# Patient Record
Sex: FEMALE | Race: WHITE | NOT HISPANIC OR LATINO | Employment: FULL TIME | URBAN - METROPOLITAN AREA
[De-identification: names, ages, dates, MRNs, and addresses within clinical notes are randomized per-mention and may not be internally consistent; named-entity substitution may affect disease eponyms.]

---

## 2018-02-20 DIAGNOSIS — L30.9 DERMATITIS: ICD-10-CM

## 2018-02-20 NOTE — TELEPHONE ENCOUNTER
Patient's last office visit was 12/14/2016.  Saw Dr. Mas who is no longer with the clinic.    Saw Dr. Muñoz for the dermatitis in 9/21/2016.    Called patient's home and asked for a call back regarding a refill request.    Nia Cisneros RN, [facility]

## 2018-02-26 NOTE — TELEPHONE ENCOUNTER
Attempt 2    LM for patient to schedule annual physical and call regarding refill request. Nikole Gonzáles RN

## 2018-03-05 RX ORDER — TRIAMCINOLONE ACETONIDE 1 MG/G
CREAM TOPICAL
Qty: 30 G | Refills: 0 | OUTPATIENT
Start: 2018-03-05

## 2018-03-05 NOTE — TELEPHONE ENCOUNTER
Routing refill request to provider for review/approval because:  Kimberly given x1 and patient did not follow up, please advise  Patient needs to be seen because it has been more than 1 year since last office visit.    Last OV with Dr. Muñoz: 9/21/2016    Attempt 3    LM for patient to return clinics call regarding a refill for medication. Asked patient to call and schedule physical if continuing care with Dr. Muñoz.     Nikole Gonzáles RN

## 2018-03-05 NOTE — TELEPHONE ENCOUNTER
Letter sent to patient letting her know that we are unable to authorize another refill until she is seen per Dr. Muñoz. Nikole Gonzáles RN

## 2019-04-01 ENCOUNTER — OFFICE VISIT (OUTPATIENT)
Dept: PEDIATRICS | Facility: CLINIC | Age: 24
End: 2019-04-01
Payer: COMMERCIAL

## 2019-04-01 VITALS
WEIGHT: 139.7 LBS | OXYGEN SATURATION: 98 % | TEMPERATURE: 98.5 F | HEART RATE: 74 BPM | SYSTOLIC BLOOD PRESSURE: 105 MMHG | BODY MASS INDEX: 23.28 KG/M2 | HEIGHT: 65 IN | DIASTOLIC BLOOD PRESSURE: 70 MMHG

## 2019-04-01 DIAGNOSIS — R11.2 NON-INTRACTABLE VOMITING WITH NAUSEA, UNSPECIFIED VOMITING TYPE: ICD-10-CM

## 2019-04-01 DIAGNOSIS — J02.9 SORE THROAT: Primary | ICD-10-CM

## 2019-04-01 LAB
DEPRECATED S PYO AG THROAT QL EIA: NORMAL
SPECIMEN SOURCE: NORMAL

## 2019-04-01 PROCEDURE — 87880 STREP A ASSAY W/OPTIC: CPT | Performed by: FAMILY MEDICINE

## 2019-04-01 PROCEDURE — 99214 OFFICE O/P EST MOD 30 MIN: CPT | Performed by: FAMILY MEDICINE

## 2019-04-01 PROCEDURE — 87081 CULTURE SCREEN ONLY: CPT | Performed by: FAMILY MEDICINE

## 2019-04-01 RX ORDER — ONDANSETRON 4 MG/1
4 TABLET, FILM COATED ORAL EVERY 8 HOURS PRN
Qty: 20 TABLET | Refills: 0 | Status: SHIPPED | OUTPATIENT
Start: 2019-04-01 | End: 2019-12-09

## 2019-04-01 ASSESSMENT — PAIN SCALES - GENERAL: PAINLEVEL: NO PAIN (0)

## 2019-04-01 ASSESSMENT — MIFFLIN-ST. JEOR: SCORE: 1403.52

## 2019-04-01 NOTE — PROGRESS NOTES
SUBJECTIVE:   Mike Lawton is a 23 year old female who presents to clinic today for the following health issues:    Acute Illness    Patient is here with concerns of having nausea and vomiting, intermittently for the past 1 week that started with URI symptoms including low-grade fever, chills and headache a week ago  Patient noticed having sore throat and nasal congestion since yesterday  Has a mild cough that is going on for the past week with no associated concerns for wheezing, shortness of breath.  Patient denies abdominal pain, vomiting, UTI symptoms, low back or flank pain.  Patient is sexually active, has Mirena IUD for contraception.  LMP-1 month ago  Does not smoke. Has no h/o asthnma or allergies.  Patient denies having diarrhea, constipation, abnormal skin rashes.         Acute illness concerns: Vomiting last Sunday through last Tuesday, URI symptoms since yesterday.  Onset: 1 week    Fever: YES last night    Chills/Sweats: YES- yesterday    Headache (location?): YES- Saturday night    Sinus Pressure:no    Conjunctivitis:  no    Ear Pain: no    Rhinorrhea: no    Congestion: YES- yesterday    Sore Throat: YES- yesteday     Cough: YES - yesterday    Wheeze: no    Decreased Appetite: no    Nausea: no    Vomiting: YES- last Sunday through last Tuesday    Diarrhea:  no    Dysuria/Freq.: no     Fatigue/Achiness: no    Sick/Strep Exposure: no but patient works in a restaurant     Therapies Tried and outcome: Tylenol - relieved fever          Problem list and histories reviewed & adjusted, as indicated.  Additional history: as documented    Patient Active Problem List   Diagnosis     ADHD (attention deficit hyperactivity disorder), combined type     Anxiety     Warts     Dermatitis     History reviewed. No pertinent surgical history.    Social History     Tobacco Use     Smoking status: Never Smoker     Smokeless tobacco: Never Used   Substance Use Topics     Alcohol use: No     Family History   Problem  Relation Age of Onset     Hypertension Maternal Grandmother          Current Outpatient Medications   Medication Sig Dispense Refill     ondansetron (ZOFRAN) 4 MG tablet Take 1 tablet (4 mg) by mouth every 8 hours as needed for nausea 20 tablet 0     azithromycin (ZITHROMAX) 250 MG tablet Two tablets first day, then one tablet daily for four days. (Patient not taking: Reported on 4/1/2019) 6 tablet 0     methylphenidate ER (BRAND OR BX/ZC GENERIC) 54 MG CR tablet any  Take 1 tablet (54 mg) by mouth every morning (Patient not taking: Reported on 4/1/2019) 30 tablet 0     triamcinolone (KENALOG) 0.1 % cream Apply sparingly to affected [id number] daily as needed for 1- 2 weeks (Patient not taking: Reported on 4/1/2019) 30 g 0     Allergies   Allergen Reactions     Penicillin G      Mother has a hx of allergie     Recent Labs   Lab Test 01/06/14  1916 01/23/13  1647   CR 0.76  --    GFRESTIMATED >90  --    GFRESTBLACK >90  --    POTASSIUM 4.1  --    TSH  --  1.07      BP Readings from Last 3 Encounters:   04/01/19 105/70   12/14/16 103/80   09/21/16 106/70    Wt Readings from Last 3 Encounters:   04/01/19 63.4 kg (139 lb 11.2 oz)   12/14/16 49.2 kg (108 lb 8 oz) (14 %)*   09/21/16 52.1 kg (114 lb 12.8 oz) (27 %)*     * Growth percentiles are based on CDC (Girls, 2-20 Years) data.                  Labs reviewed in EPIC    Reviewed and updated as needed this visit by clinical staff       Reviewed and updated as needed this visit by Provider         ROS:  CONSTITUTIONAL: NEGATIVE for fever, chills, change in weight  INTEGUMENTARY/SKIN: NEGATIVE for worrisome rashes, moles or lesions  EYES: NEGATIVE for vision changes or irritation  ENT/MOUTH: as above  RESP:as above  CV: NEGATIVE for chest pain, palpitations or peripheral edema  GI: as above  : mirena IUD  MUSCULOSKELETAL: NEGATIVE for significant arthralgias or myalgia  PSYCHIATRIC: NEGATIVE for changes in mood or affect    OBJECTIVE:     /70 (BP  "Location: Right arm, Patient Position: Sitting, Cuff Size: Adult Regular)   Pulse 74   Temp 98.5  F (36.9  C) (Oral)   Ht 1.657 m (5' 5.25\")   Wt 63.4 kg (139 lb 11.2 oz)   SpO2 98%   BMI 23.07 kg/m    Body mass index is 23.07 kg/m .  GENERAL: healthy, alert and no distress  EYES: Eyes grossly normal to inspection  HENT: normal cephalic/atraumatic, ear canals and TM's normal, nose and mouth without ulcers or lesions, oropharynx clear, oral mucous membranes moist, sinuses: not tender and minimal posterior pharyngeal wall erythema  NECK: no adenopathy, no asymmetry, masses, or scars and thyroid normal to palpation  RESP: lungs clear to auscultation - no rales, rhonchi or wheezes  CV: regular rate and rhythm, normal S1 S2, no S3 or S4, no murmur, click or rub, no peripheral edema and peripheral pulses strong  ABDOMEN: soft, non tender, no guarding or rigidity, no organomegaly, normal BS, no costovertebral angle tenderness  MS: no gross musculoskeletal defects noted, no edema  SKIN: no suspicious lesions or rashes  BACK: no CVA tenderness, no paralumbar tenderness  PSYCH: mentation appears normal, affect normal/bright    Diagnostic Test Results:  Results for orders placed or performed in visit on 04/01/19 (from the past 24 hour(s))   Strep, Rapid Screen   Result Value Ref Range    Specimen Description Throat     Rapid Strep A Screen       NEGATIVE: No Group A streptococcal antigen detected by immunoassay, await culture report.       ASSESSMENT/PLAN:             1. Sore throat  Results for orders placed or performed in visit on 04/01/19   Strep, Rapid Screen   Result Value Ref Range    Specimen Description Throat     Rapid Strep A Screen       NEGATIVE: No Group A streptococcal antigen detected by immunoassay, await culture report.         Negative test results reviewed with the patient and reassured.  Recommended warm saline gargles, tylenol or motrin prn for pain, throat lozenges, fluids and rtc if no better by " 1week or sooner prn.      - Strep, Rapid Screen  - Beta strep group A culture    2. Non-intractable vomiting with nausea, unspecified vomiting type  DDX-viral? ? Uti/streptococcal infection/? Appendicitis?? pregnancy  Patient does not have any concerns for UTI symptoms  Reviewed negative strep test as mentioned above  Given the absence of fever abdominal pain, appendicitis is less likely  Patient is using Mirena for contraception  Considered UPT, patient declined  We will treat with Zofran as needed for nausea and vomiting, push fluids including Gatorade and Pedialyte  If symptoms are not any better in 3-4 days, patient understands to follow-up for further evaluation  Dosing and potential medication side effects discussed.  Patient verbalised understanding and is agreeable to the plan.      - ondansetron (ZOFRAN) 4 MG tablet; Take 1 tablet (4 mg) by mouth every 8 hours as needed for nausea  Dispense: 20 tablet; Refill: 0    Chart documentation done in part with Dragon Voice recognition Software. Although reviewed after completion, some word and grammatical error may remain.    See Patient Instructions    Ld Muñoz MD  [facility]

## 2019-04-02 LAB
BACTERIA SPEC CULT: NORMAL
SPECIMEN SOURCE: NORMAL

## 2019-11-26 ENCOUNTER — OFFICE VISIT (OUTPATIENT)
Dept: FAMILY MEDICINE | Facility: CLINIC | Age: 24
End: 2019-11-26
Payer: COMMERCIAL

## 2019-11-26 VITALS
HEART RATE: 81 BPM | TEMPERATURE: 98.6 F | BODY MASS INDEX: 24.32 KG/M2 | WEIGHT: 146 LBS | HEIGHT: 65 IN | DIASTOLIC BLOOD PRESSURE: 74 MMHG | SYSTOLIC BLOOD PRESSURE: 114 MMHG | OXYGEN SATURATION: 97 %

## 2019-11-26 DIAGNOSIS — J02.0 STREP THROAT: Primary | ICD-10-CM

## 2019-11-26 LAB
DEPRECATED S PYO AG THROAT QL EIA: ABNORMAL
SPECIMEN SOURCE: ABNORMAL

## 2019-11-26 PROCEDURE — 87880 STREP A ASSAY W/OPTIC: CPT | Performed by: NURSE PRACTITIONER

## 2019-11-26 PROCEDURE — 99213 OFFICE O/P EST LOW 20 MIN: CPT | Performed by: NURSE PRACTITIONER

## 2019-11-26 RX ORDER — AZITHROMYCIN 250 MG/1
TABLET, FILM COATED ORAL
Qty: 6 TABLET | Refills: 0 | Status: SHIPPED | OUTPATIENT
Start: 2019-11-26 | End: 2019-12-09

## 2019-11-26 ASSESSMENT — PAIN SCALES - GENERAL: PAINLEVEL: EXTREME PAIN (8)

## 2019-11-26 ASSESSMENT — MIFFLIN-ST. JEOR: SCORE: 1427.09

## 2019-11-26 NOTE — PROGRESS NOTES
Subjective     Mike Lawton is a 24 year old female who presents to clinic today for the following health issues:    HPI   ENT Symptoms             Symptoms: cc Present Absent Comment   Fever/Chills   x    Fatigue  x     Muscle Aches   x    Eye Irritation   x    Sneezing  x     Nasal Thaddeus/Drg  x     Sinus Pressure/Pain  x     Loss of smell   x    Dental pain   x    Sore Throat  x     Swollen Glands  x     Ear Pain/Fullness   x    Cough  x     Wheeze   x    Chest Pain   x    Shortness of breath   x    Rash   x    Other         Symptom duration:  about 1 week   Symptom severity:  moderate   Treatments tried:  Nyquil   Contacts:  work       No drooling or trismus  No chest pain or shortness of breath    Patient Active Problem List   Diagnosis     ADHD (attention deficit hyperactivity disorder), combined type     Anxiety     Warts     Dermatitis     History reviewed. No pertinent surgical history.    Social History     Tobacco Use     Smoking status: Never Smoker     Smokeless tobacco: Never Used   Substance Use Topics     Alcohol use: No     Family History   Problem Relation Age of Onset     Hypertension Maternal Grandmother          Current Outpatient Medications   Medication Sig Dispense Refill     azithromycin (ZITHROMAX) 250 MG tablet Take 2 tablets (500 mg) by mouth daily for 1 day, THEN 1 tablet (250 mg) daily for 4 days. 6 tablet 0     ondansetron (ZOFRAN) 4 MG tablet Take 1 tablet (4 mg) by mouth every 8 hours as needed for nausea 20 tablet 0     methylphenidate ER (BRAND OR BX/ZC GENERIC) 54 MG CR tablet any  Take 1 tablet (54 mg) by mouth every morning (Patient not taking: Reported on 11/26/2019) 30 tablet 0     Allergies   Allergen Reactions     Penicillin G      Mother has a hx of allergie         Reviewed and updated as needed this visit by Provider  Tobacco  Allergies  Meds  Problems  Med Hx  Surg Hx  Fam Hx         Review of Systems   ROS COMP: Constitutional, HEENT,  "cardiovascular, pulmonary, gi and gu systems are negative, except as otherwise noted.      Objective    /74 (BP Location: Right arm, Patient Position: Chair, Cuff Size: Adult Regular)   Pulse 81   Temp 98.6  F (37  C) (Oral)   Ht 1.657 m (5' 5.25\")   Wt 66.2 kg (146 lb)   SpO2 97%   BMI 24.11 kg/m    Body mass index is 24.11 kg/m .  Physical Exam   GENERAL: healthy, alert and no distress  EYES: Eyes grossly normal to inspection, PERRL and conjunctivae and sclerae normal  HENT: ear canals and TM's normal, nose and mouth without ulcers or lesions. Posterior oropharynx erythematous. No evidence of peritonsillar abscess   NECK: anterior cervical lymphadenopathy  RESP: lungs clear to auscultation - no rales, rhonchi or wheezes  CV: regular rate and rhythm, normal S1 S2, no S3 or S4, no murmur, click or rub, no peripheral edema and peripheral pulses strong  MS: no gross musculoskeletal defects noted, no edema  PSYCH: mentation appears normal, affect normal/bright    Diagnostic Test Results:  Labs reviewed in Epic  Results for orders placed or performed in visit on 11/26/19 (from the past 24 hour(s))   Strep, Rapid Screen   Result Value Ref Range    Specimen Description Throat     Rapid Strep A Screen (A)      POSITIVE: Group A Streptococcal antigen detected by immunoassay.           Assessment & Plan     1. Strep throat  Positive rapid strep  Start antibiotics today. Will treat with azithromycin due to PENICILLIN allergy  Tylenol or ibuprofen as needed for pain or fever  Contagious x12 hours  Change toothbrush when no longer contagious  If worsening or not improving in 3 days, follow up with primary care provider, sooner if needed  - Strep, Rapid Screen  - azithromycin (ZITHROMAX) 250 MG tablet; Take 2 tablets (500 mg) by mouth daily for 1 day, THEN 1 tablet (250 mg) daily for 4 days.  Dispense: 6 tablet; Refill: 0       See Patient Instructions    Return in about 3 days (around 11/29/2019), or if symptoms " worsen or fail to improve.     The benefits, risks and potential side effects were discussed in detail. Black box warnings discussed as relevant. All patient questions were answered. The patient was instructed to follow up immediately if any adverse reactions develop.    Return precautions discussed, including when to seek urgent/emergent care.    Patient verbalizes understanding and agrees with plan of care. Patient stable for discharge.      JUVE Moreau [facility]

## 2019-11-26 NOTE — PATIENT INSTRUCTIONS
Positive rapid strep  Start antibiotics today  Tylenol or ibuprofen as needed for pain or fever  Contagious x12 hours  Change toothbrush when no longer contagious  If worsening or not improving in 3 days, follow up with primary care provider, sooner if needed        Patient Education     Pharyngitis: Strep (Confirmed)    You have had a positive test for strep throat. Strep throat is a contagious illness. It is spread by coughing, kissing or by touching others after touching your mouth or nose. Symptoms include throat pain that is worse with swallowing, aching all over, headache, and fever. It is treated with antibiotic medicine. This should help you start to feel better in 1 to 2 days.  Home care    Rest at home. Drink plenty of fluids to you won't get dehydrated.    No work or school for the first 2 days of taking the antibiotics. After this time, you will not be contagious. You can then return to school or work if you are feeling better.     Take antibiotic medicine for the full 10 days, even if you feel better. This is very important to ensure the infection is treated. It is also important to prevent medicine-resistant germs from developing. If you were given an antibiotic shot, you don't need any more antibiotics.    You may use acetaminophen or ibuprofen to control pain or fever, unless another medicine was prescribed for this. Talk with your healthcare provider before taking these medicines if you have chronic liver or kidney disease. Also talk with your healthcare provider if you have had a stomach ulcer or GI bleeding.    Throat lozenges or sprays help reduce pain. Gargling with warm saltwater will also reduce throat pain. Dissolve 1/2 teaspoon of salt in 1 glass of warm water. This may be useful just before meals.     Soft foods are OK. Don't eat salty or spicy foods.  Follow-up care  Follow up with your healthcare provider or our staff if you don't get better over the next week.  When to seek medical  advice  Call your healthcare provider right away if any of these occur:    Fever of 100.4 F (38 C) or higher, or as directed by your healthcare provider    New or worsening ear pain, sinus pain, or headache    Painful lumps in the back of neck    Stiff neck    Lymph nodes getting larger or becoming soft in the middle    You can't swallow liquids or you can't open your mouth wide because of throat pain    Signs of dehydration. These include very dark urine or no urine, sunken eyes, and dizziness.    Trouble breathing or noisy breathing    Muffled voice    Rash  Prevention  Here are steps you can take to help prevent an infection:    Keep good hand washing habits.    Don t have close contact with people who have sore throats, colds, or other upper respiratory infections.    Don t smoke, and stay away from secondhand smoke.  Date Last Reviewed: 11/1/2017 2000-2018 [employer]. [address] [place] [address]. All rights reserved. This information is not intended as a substitute for professional medical care. Always follow your healthcare professional's instructions.

## 2019-12-05 ENCOUNTER — TELEPHONE (OUTPATIENT)
Dept: FAMILY MEDICINE | Facility: CLINIC | Age: 24
End: 2019-12-05

## 2019-12-05 NOTE — LETTER
December 5, 2019    Mike Lawton  [address] [place] [id number]    Dear Mike Lawton,     At [facility] we care about your health and are committed to providing quality patient care.    Which includes staying current on preventive cancer screenings.  You can increase your chances of finding and treating cancers through regular screenings.      Our records indicate you may be due for the following preventive screening(s):    Cervical Cancer Screening    Pap smear is a screening test used to detect cervical cancer. It is recommended every three years for women 21 and older. The test should be done at least once every three years but women who are at greater risk for cervical cancer may need to have the test more often.    To schedule an appointment or discuss this screening further, you may contact us by phone at the [facility] at [phone] or online through the patient [email or web address]    If you have had any of the screenings listed above at another facility, please call us so that we may update your chart.      Your partners in health,      Quality Committee at [facility]

## 2019-12-05 NOTE — TELEPHONE ENCOUNTER
Panel Management Review   One phone call and send letter if unable to reach them or Zamzeehart message and send letter if not read after 2 weeks (You will get a message to your inbasket)          Health Maintenance Due   Topic Date Due     HPV IMMUNIZATION (1 - Female 2-dose series) 11/22/2008     HIV SCREENING  11/22/2012     PREVENTIVE CARE VISIT  08/06/2015     PAP  11/22/2018     PHQ-2  01/01/2019     INFLUENZA VACCINE (1) 09/01/2019        Fail List measure:         Patient is due/failing the following:   PAP    Action needed:   Patient needs office visit for annual exam.    Type of outreach:    Sent letter.    Questions for provider review:    None                                                                                                                               Delfina Johnson MA

## 2019-12-09 ENCOUNTER — OFFICE VISIT (OUTPATIENT)
Dept: URGENT CARE | Facility: URGENT CARE | Age: 24
End: 2019-12-09
Payer: OTHER MISCELLANEOUS

## 2019-12-09 ENCOUNTER — ANCILLARY PROCEDURE (OUTPATIENT)
Dept: GENERAL RADIOLOGY | Facility: CLINIC | Age: 24
End: 2019-12-09
Attending: PHYSICIAN ASSISTANT
Payer: OTHER MISCELLANEOUS

## 2019-12-09 VITALS
TEMPERATURE: 98.6 F | HEART RATE: 77 BPM | HEIGHT: 64 IN | RESPIRATION RATE: 14 BRPM | BODY MASS INDEX: 24.75 KG/M2 | DIASTOLIC BLOOD PRESSURE: 79 MMHG | WEIGHT: 145 LBS | OXYGEN SATURATION: 96 % | SYSTOLIC BLOOD PRESSURE: 116 MMHG

## 2019-12-09 DIAGNOSIS — S49.92XA SHOULDER INJURY, LEFT, INITIAL ENCOUNTER: Primary | ICD-10-CM

## 2019-12-09 PROCEDURE — 99214 OFFICE O/P EST MOD 30 MIN: CPT | Performed by: PHYSICIAN ASSISTANT

## 2019-12-09 PROCEDURE — 73030 X-RAY EXAM OF SHOULDER: CPT | Mod: LT

## 2019-12-09 ASSESSMENT — ENCOUNTER SYMPTOMS
PALPITATIONS: 0
DIZZINESS: 0
COUGH: 0
NAUSEA: 0
FEVER: 0
DIARRHEA: 0
HEMATOLOGIC/LYMPHATIC NEGATIVE: 1
BRUISES/BLEEDS EASILY: 0
SHORTNESS OF BREATH: 0
WEAKNESS: 0
NECK PAIN: 0
EYES NEGATIVE: 1
SORE THROAT: 0
RHINORRHEA: 0
JOINT SWELLING: 0
WOUND: 0
BACK PAIN: 0
RESPIRATORY NEGATIVE: 1
HEADACHES: 0
NECK STIFFNESS: 0
ARTHRALGIAS: 1
LIGHT-HEADEDNESS: 0
MYALGIAS: 0
VOMITING: 0
ALLERGIC/IMMUNOLOGIC NEGATIVE: 1
CHILLS: 0
ENDOCRINE NEGATIVE: 1
CARDIOVASCULAR NEGATIVE: 1

## 2019-12-09 ASSESSMENT — MIFFLIN-ST. JEOR: SCORE: 1402.72

## 2019-12-09 NOTE — PROGRESS NOTES
Chief Complaint:    Chief Complaint   Patient presents with     Work Comp     heavy lifting ( left shoulder and hand, numbness and tingle in hand ) hurt the worst in left bicep       HPI: Mike Lawton is an 24 year old female who presents for evaluation and treatment of L shoulder pain with numbness and tingling in the L hand.  Symptoms started this morning when she was lifting some coils at work this morning.  She is having L shoulder pain, and some tingling in the L hand.  Her symptoms have improved a little.  She has not tried anything for the pain.  She has not injured the L shoulder in the past.          ROS:      Review of Systems   Constitutional: Negative for chills and fever.   HENT: Negative for congestion, ear pain, rhinorrhea and sore throat.    Eyes: Negative.    Respiratory: Negative.  Negative for cough and shortness of breath.    Cardiovascular: Negative.  Negative for chest pain and palpitations.   Gastrointestinal: Negative for diarrhea, nausea and vomiting.   Endocrine: Negative.    Genitourinary: Negative.    Musculoskeletal: Positive for arthralgias. Negative for back pain, joint swelling, myalgias, neck pain and neck stiffness.   Skin: Negative.  Negative for rash and wound.   Allergic/Immunologic: Negative.  Negative for immunocompromised state.   Neurological: Negative for dizziness, weakness, light-headedness and headaches.   Hematological: Negative.  Does not bruise/bleed easily.        Family History   Family History   Problem Relation Age of Onset     Hypertension Maternal Grandmother        Social History  Social History     Socioeconomic History     Marital status: Single     Spouse name: Not on file     Number of children: Not on file     Years of education: Not on file     Highest education level: Not on file   Occupational History     Not on file   Social Needs     Financial resource strain: Not on file     Food insecurity:     Worry: Not on file     Inability: Not on file      "Transportation needs:     Medical: Not on file     Non-medical: Not on file   Tobacco Use     Smoking status: Never Smoker     Smokeless tobacco: Never Used   Substance and Sexual Activity     Alcohol use: No     Drug use: No     Sexual activity: Never   Lifestyle     Physical activity:     Days per week: Not on file     Minutes per session: Not on file     Stress: Not on file   Relationships     Social connections:     Talks on phone: Not on file     Gets together: Not on file     Attends [employer] service: Not on file     Active member of club or organization: Not on file     Attends meetings of clubs or organizations: Not on file     Relationship status: Not on file     Intimate partner violence:     Fear of current or ex partner: Not on file     Emotionally abused: Not on file     Physically abused: Not on file     Forced sexual activity: Not on file   Other Topics Concern     Not on file   Social History Narrative     Not on file        Surgical History:  History reviewed. No pertinent surgical history.     Problem List:  Patient Active Problem List   Diagnosis     ADHD (attention deficit hyperactivity disorder), combined type     Anxiety     Warts     Dermatitis        Allergies:  Allergies   Allergen Reactions     Penicillin G      Mother has a hx of allergie        Current Meds:    Current Outpatient Medications:      methylphenidate ER (BRAND OR BX/ZC GENERIC) 54 MG CR tablet any , Take 1 tablet (54 mg) by mouth every morning, Disp: 30 tablet, Rfl: 0     PHYSICAL EXAM:     Vital signs noted and reviewed by Kel Vega PA-C  /79   Pulse 77   Temp 98.6  F (37  C)   Resp 14   Ht 1.626 m (5' 4\")   Wt 65.8 kg (145 lb)   SpO2 96%   BMI 24.89 kg/m       PEFR:    Physical Exam  Vitals signs and nursing note reviewed.   Constitutional:       General: She is not in acute distress.     Appearance: She is well-developed. She is not ill-appearing, toxic-appearing or diaphoretic.   HENT:     "  Head: Normocephalic and atraumatic.      Right Ear: Tympanic membrane and external ear normal. No drainage, swelling or tenderness. Tympanic membrane is not perforated, erythematous, retracted or bulging.      Left Ear: Tympanic membrane and external ear normal. No drainage, swelling or tenderness. Tympanic membrane is not perforated, erythematous, retracted or bulging.      Nose: No mucosal edema, congestion or rhinorrhea.      Right Sinus: No maxillary sinus tenderness or frontal sinus tenderness.      Left Sinus: No maxillary sinus tenderness or frontal sinus tenderness.      Mouth/Throat:      Pharynx: No pharyngeal swelling, oropharyngeal exudate, posterior oropharyngeal erythema or uvula swelling.      Tonsils: No tonsillar abscesses.   Eyes:      Pupils: Pupils are equal, round, and reactive to light.   Neck:      Musculoskeletal: Full passive range of motion without pain, normal range of motion and neck supple.      Trachea: Trachea normal.   Cardiovascular:      Rate and Rhythm: Normal rate and regular rhythm.      Heart sounds: Normal heart sounds, S1 normal and S2 normal. No murmur. No friction rub. No gallop.    Pulmonary:      Effort: Pulmonary effort is normal. No respiratory distress.      Breath sounds: Normal breath sounds. No decreased breath sounds, wheezing, rhonchi or rales.   Abdominal:      General: Bowel sounds are normal. There is no distension.      Palpations: Abdomen is soft. Abdomen is not rigid. There is no mass.      Tenderness: There is no abdominal tenderness. There is no guarding or rebound.   Musculoskeletal:      Left shoulder: She exhibits pain. She exhibits normal range of motion, no tenderness, no bony tenderness, no swelling, no effusion, no crepitus, no deformity, no spasm, normal pulse and normal strength.      Left hand: She exhibits normal range of motion, normal two-point discrimination, normal capillary refill and no swelling.      Comments: L  strength 5/5, wrist  strength 5/5.  L arm is neurologically intact.     Lymphadenopathy:      Cervical: No cervical adenopathy.   Skin:     General: Skin is warm and dry.   Neurological:      Mental Status: She is alert and oriented to person, place, and time.      Cranial Nerves: No cranial nerve deficit.      Deep Tendon Reflexes: Reflexes are normal and symmetric.   Psychiatric:         Behavior: Behavior normal. Behavior is cooperative.         Thought Content: Thought content normal.         Judgment: Judgment normal.          Labs:     No results found for any visits on 12/09/19.    Medical Decision Making:    Differential Diagnosis:  MS Injury Pain: sprain, fracture, tendonitis, muscle strain, contusion and dislocation      ASSESSMENT:     1. Shoulder injury, left, initial encounter         PLAN:     XR of the L shoulder was negative for any acute fracture per my read.    L arm Neuro exam was benign.  No deformity of the L bicep muscle ruling out bicep tendon tear.  Order placed for her to follow up with PT.  She will bee off of work until she follows up with her PCP later this week.  Staff assisted her with getting this appointment today.  Worrisome symptoms discussed with instructions to go to the ED.  Patient verbalized understanding and agreed with this plan.     Kel Vega PA-C  12/9/2019, 12:40 PM

## 2019-12-09 NOTE — LETTER
[facility]  [address]  [facility] [address]          December 9, 2019    RE:  Mike Lawton                                                                                                                                                       [address] [place] [address] [address]  [place] [address]            To whom it may concern:    Mike Lawton is under my professional care for Shoulder injury, left, initial encounter She will be unable to work until cleared by her primary provider     Sincerely,        Kel Vega PA-C

## 2019-12-09 NOTE — PATIENT INSTRUCTIONS
Patient Education     Shoulder Sprain  A sprain is a stretching or tearing of the ligaments that hold a joint together. A sprain may take up to 8 weeks to fully heal, depending on how severe it is. Moderate to severe shoulder sprains are treated with a sling or shoulder immobilizer. Minor sprains can be treated without any special support.  Home care  The following guidelines will help you care for your injury at home:    If a sling was given to you, leave it in place for the time advised by your healthcare provider. If you aren t sure how long to wear it, ask for advice. If the sling becomes loose, adjust it so that your forearm is level with the ground. Your shoulder should feel well supported.    Put an ice pack on the injured area for 20 minutes every 1 to 2 hours the first day. You can make your own ice pack by putting ice cubes in a plastic bag. A bag of frozen peas or something similar works well too. Wrap the bag in a thin towel. Continue with ice packs 3 to 4 times a day for the next 2 to 3 days. Then use the pack as needed to ease pain and swelling.    You may use acetaminophen or ibuprofen to control pain, unless another pain medicine was prescribed. If you have chronic liver or kidney disease, talk with your healthcare provider before using these medicines. Also talk with your provider if you ve had a stomach ulcer or gastrointestinal bleeding.    Shoulder joints become stiff if left in a sling for too long. You should start range of motion exercises about 7 to 10 days after the injury. Talk with your provider to find out what type of exercises to do and how soon to start.  Follow-up care  Follow up with your healthcare provider, or as advised.  Any X-rays you had today don t show any broken bones, breaks, or fractures. Sometimes fractures don t show up on the first X-ray. Bruises and sprains can sometimes hurt as much as a fracture. These injuries can take time to heal completely. If your symptoms  don t improve or they get worse, talk with your provider. You may need a repeat X-ray or other treatments.  When to seek medical advice  Call your healthcare provider right away if any of these occur:    Shoulder pain or swelling in your arm that gets worse    Fingers become cold, blue, numb, or tingly    Large amount of bruising of the shoulder or upper arm    Fever or chills  Date Last Reviewed: 8/1/2016 2000-2018 [employer]. [address] [place] [address], [place] [id number]. All rights reserved. This information is not intended as a substitute for professional medical care. Always follow your healthcare professional's instructions.

## 2019-12-13 ENCOUNTER — OFFICE VISIT (OUTPATIENT)
Dept: FAMILY MEDICINE | Facility: CLINIC | Age: 24
End: 2019-12-13
Payer: OTHER MISCELLANEOUS

## 2019-12-13 VITALS
HEIGHT: 64 IN | OXYGEN SATURATION: 97 % | SYSTOLIC BLOOD PRESSURE: 105 MMHG | RESPIRATION RATE: 16 BRPM | WEIGHT: 142.8 LBS | HEART RATE: 80 BPM | DIASTOLIC BLOOD PRESSURE: 72 MMHG | BODY MASS INDEX: 24.38 KG/M2 | TEMPERATURE: 98.4 F

## 2019-12-13 DIAGNOSIS — Z02.6 ENCOUNTER RELATED TO WORKER'S COMPENSATION CLAIM: ICD-10-CM

## 2019-12-13 DIAGNOSIS — S49.92XA SHOULDER INJURY, LEFT, INITIAL ENCOUNTER: Primary | ICD-10-CM

## 2019-12-13 PROCEDURE — 99213 OFFICE O/P EST LOW 20 MIN: CPT | Performed by: NURSE PRACTITIONER

## 2019-12-13 RX ORDER — IBUPROFEN 600 MG/1
600 TABLET, FILM COATED ORAL EVERY 6 HOURS PRN
Qty: 30 TABLET | Refills: 0 | Status: SHIPPED | OUTPATIENT
Start: 2019-12-13 | End: 2020-01-03

## 2019-12-13 ASSESSMENT — PAIN SCALES - GENERAL: PAINLEVEL: SEVERE PAIN (6)

## 2019-12-13 ASSESSMENT — MIFFLIN-ST. JEOR: SCORE: 1392.74

## 2019-12-13 NOTE — LETTER
REPORT OF WORK COMP    [address] [address]  [phone]      PATIENT DATA    Employee Name: Mike Lawton      : 1995     #: [id number]    Work related injury: Yes  Employer at time of injury:    Employer contact & phone:    Employed elsewhere? No  Workers' Compensation Carrier/Managed Care Plan:       Today's date: 2019  Date of injury: 2019  Date of first visit: 2019    PROVIDER EVALUATION: Please fill in as needed.  Please give copy to employee for employer.    1. Diagnosis: left shoulder injury    2. Treatment: ice, medication, lifting restrictions.  3. Medication: ibuprofen 600 mg q6h PRN pain  NOTE: When ordering a medication, [place] Rules require Work Comp or WC on prescriptions.    4. No work from  to  per urgent care provider seen on .  5. Return to work date: 2019   ** WITH RESTRICTIONS? Yes, with work restrictions: * Restricted lifting - Maximum lift in pounds: 5  * Limited repetitive motion.    * right hand work with left hand assist - no lifting more than 5 lb with left upper extremity  DURATION OF LIMITATIONS: 1 week      RESTRICTIONS: Unlimited unless listed.  Restrictions apply to home and leisure also.  If work restrictions is not available, the employee is totally disabled.    Maximum Medical Improvement (Date): 2020  Any Permanent Partial Disability? Deferred to future exam/consult.    Provider comments: as above    Medical Examiner: JUVE Moreau CNP           License or registration: [id number]    Next appointment: 1 week    CC: Employer, Managed Care Plan/Payor, Patient

## 2019-12-13 NOTE — PROGRESS NOTES
Subjective     Mike Lawton is a 24 year old female who presents to clinic today for the following health issues:    HPI   ED/UC Followup:    Facility:  [facility]   Date of visit: 12/09/2019  Reason for visit: Work comp shoulder pain   Current Status: Still sore        Joint Pain    Onset: 12/09/2019    Description:   Location: left shoulder and bicep   Character: Dull ache    Intensity: mild, moderate    Progression of Symptoms: same, intermittent    Accompanying Signs & Symptoms:  Other symptoms: numbness in to the hand     History:   Previous similar pain: no       Precipitating factors:   Trauma or overuse: YES- work comp    Alleviating factors:  Improved by: nothing    Therapies Tried and outcome: NONE      24 year old female presents with the above concerns. She was lifting metal coil pack off a cart and it was heavier than she thought and felt immediate left shoulder pain. Still feels sore but it is better than it was. Right hand dominant. No numbness or tingling but feels weak from pain. Not pregnant or breastfeeding.    Patient Active Problem List   Diagnosis     ADHD (attention deficit hyperactivity disorder), combined type     Anxiety     Warts     Dermatitis     History reviewed. No pertinent surgical history.    Social History     Tobacco Use     Smoking status: Never Smoker     Smokeless tobacco: Never Used   Substance Use Topics     Alcohol use: No     Family History   Problem Relation Age of Onset     Hypertension Maternal Grandmother          Current Outpatient Medications   Medication Sig Dispense Refill     ibuprofen (ADVIL/MOTRIN) 600 MG tablet Take 1 tablet (600 mg) by mouth every 6 hours as needed for moderate pain WORK COMP 30 tablet 0     methylphenidate ER (BRAND OR BX/ZC GENERIC) 54 MG CR tablet any  Take 1 tablet (54 mg) by mouth every morning 30 tablet 0     Allergies   Allergen Reactions     Penicillin G      Mother has a hx of allergie     BP Readings  "from Last 3 Encounters:   12/13/19 105/72   12/09/19 116/79   11/26/19 114/74    Wt Readings from Last 3 Encounters:   12/13/19 64.8 kg (142 lb 12.8 oz)   12/09/19 65.8 kg (145 lb)   11/26/19 66.2 kg (146 lb)                      Reviewed and updated as needed this visit by Provider  Tobacco  Allergies  Meds  Problems  Med Hx  Surg Hx  Fam Hx         Review of Systems   ROS COMP: Constitutional, HEENT, cardiovascular, pulmonary, gi and gu systems are negative, except as otherwise noted.      Objective    /72 (BP Location: Right arm, Patient Position: Sitting, Cuff Size: Adult Large)   Pulse 80   Temp 98.4  F (36.9  C) (Oral)   Resp 16   Ht 1.626 m (5' 4\")   Wt 64.8 kg (142 lb 12.8 oz)   LMP 11/15/2019   SpO2 97%   Breastfeeding No   BMI 24.51 kg/m    Body mass index is 24.51 kg/m .  Physical Exam   GENERAL: healthy, alert and no distress  MS: no gross musculoskeletal defects noted, no edema. Left deltoid tenderness. No swelling, divots or masses. Difficulty with apley scratch overhead due to pain. Empty can + on the left.  SKIN: no suspicious lesions or rashes. No erythema or ecchymosis.   PSYCH: mentation appears normal, affect normal/bright    Diagnostic Test Results:  Labs reviewed in Epic  Xray - Reviewed from  Visit:    LEFT SHOULDER THREE VIEWS  12/9/2019 1:00 PM     HISTORY:  Pain after lifting injury today.     COMPARISON:  None.     FINDINGS:  No fracture or osseous lesion is seen. The joint spaces are  well preserved. No soft tissue pathology is seen.                                                                        IMPRESSION:  Unremarkable examination.       TAHIR SALINAS MD        Assessment & Plan       ICD-10-CM    1. Shoulder injury, left, initial encounter S49.92XA ibuprofen (ADVIL/MOTRIN) 600 MG tablet   2. Encounter related to worker's compensation claim [email or web address] ibuprofen (ADVIL/MOTRIN) 600 MG tablet   Ice 15 minutes 4-6 times daily  Right hand work with left hand " assist  No lifting more than 5 lb with left arm  Continue range of motion exercises  Ibuprofen as needed for pain. Take with food  Work restrictions for home and work  Follow up in 1 week       See Patient Instructions    Return in about 1 week (around 12/20/2019).     The benefits, risks and potential side effects were discussed in detail. Black box warnings discussed as relevant. All patient questions were answered. The patient was instructed to follow up immediately if any adverse reactions develop.    Return precautions discussed, including when to seek urgent/emergent care.    Patient verbalizes understanding and agrees with plan of care. Patient stable for discharge.      JUVE Moreau [facility]

## 2019-12-13 NOTE — PATIENT INSTRUCTIONS
Ice 15 minutes 4-6 times daily  Right hand work with left hand assist  No lifting more than 5 lb with left arm  Continue range of motion exercises  Ibuprofen as needed for pain. Take with food  Work restrictions for home and work  Follow up in 1 week  Patient Education     Shoulder Pain with Uncertain Cause  Shoulder pain can have many causes. Pain often comes from the structures that surround the shoulder joint. These are the joint capsule, ligaments, tendons, muscles, and bursa. Pain can also come from cartilage in the joint. Cartilage can become worn out or injured. It s important to know what s causing your pain so the healthcare provider can use the correct treatment. But sometimes it s difficult to find the exact cause of shoulder pain. You may need to see a specialist (orthopedist). You may also need special tests such as a CT scan or MRI. The provider may need to use special tools to look inside the joint (arthroscopy).  Shoulder pain can be treated with a sling or a device that keeps your shoulder from moving. You can take an anti-inflammatory medicine such as ibuprofen to ease pain. You may need to do special shoulder exercises. Follow up with a specialist if the pain is severe or doesn t go away after a few weeks.  Home care  Follow these tips when caring for yourself at home:    If a sling was given to you, leave it in place for the time advised by your healthcare provider. If you aren t sure how long to wear it, ask for advice. If the sling becomes loose, adjust it so that your forearm is level with the ground. Your shoulder should feel well supported.    Put an ice pack on the injured area for 20 minutes every 1 to 2 hours the first day. You can make your own ice pack by putting ice cubes in a plastic bag. Wrap the bag in a thin towel. Continue with ice packs 3 to 4 times a day for the next 2 days. Then use the pack as needed to ease pain and swelling.    You may use acetaminophen or ibuprofen to  control pain, unless another pain medicine was prescribed. If you have chronic liver or kidney disease, talk with your healthcare provider before using these medicines. Also talk with your provider if you ve ever had a stomach ulcer or GI bleeding.    Shoulder pain may seem worse at night, when there is less to distract you from the pain. If you sleep on your side, try to keep weight off your painful shoulder. Propping pillows behind you may stop you from rolling over onto that shoulder during sleep.     Shoulder and elbow joints can become stiff if left in a sling for too long. You should start range of motion exercises about 7 to 10 days after the injury. Talk with your provider to find out what type of exercises to do and how soon to start.    You can take the sling off to shower or bathe.  Follow-up care  Follow up with your healthcare provider if you don t start to get better in the next 5 days.  When to seek medical advice  Call your healthcare provider right away if any of these occur:    Pain or swelling gets worse or continues for more than a few days    Your hand or fingers become cold, blue, numb, or tingly    Large amount of bruising on your shoulder or upper arm    Difficulty moving your hand or fingers    Weakness in your hand or fingers    Your shoulder becomes stiff    It feels like your shoulder is popping out    You are less able to do your daily activities  Date Last Reviewed: 10/1/2016    9436-1407 [employer] [address] [place] [address], [place] [address]. All rights reserved. This information is not intended as a substitute for professional medical care. Always follow your healthcare professional's instructions.

## 2019-12-16 ENCOUNTER — TELEPHONE (OUTPATIENT)
Dept: FAMILY MEDICINE | Facility: CLINIC | Age: 24
End: 2019-12-16

## 2019-12-16 DIAGNOSIS — S49.92XA SHOULDER INJURY, LEFT, INITIAL ENCOUNTER: Primary | ICD-10-CM

## 2019-12-16 NOTE — TELEPHONE ENCOUNTER
"Called and spoke with patient.    Complaints of shoulder and arm problem. Seen on 12/13/19 for this. Is work related, work comp issue.  Today, patient went in to work, working per restrictions and was having a \"tingly\" feeling in left hand. Can move hand and  things, but \"weird\" feeling in hand now that was not present before. Employer sent patient home from work as to not risk any further injury. Boss advised patient to speak with provider about plan before returning back to work.  Patient states \"it's not so much pain, but shoulder is sore and tender\". Can also feel more into left bicep now. Took 600 mg IBU this am, about 6:30 am and applied ice to shoulder when got home from work, about 1 hour ago. Ice applied for 15 minutes. Has not taken any more IBU, at this time. Stretching, exercises, medication, ice, don't seem to helping much right now. Hand still tingly feeling now, at time of call.  Patient does not know what caused current complaint with hand. Patient not sure what she should do now, needs further advise before she can return to work tomorrow.     Routing to provider to review and advise.    Annemarie Ramsey RN  [facility]      "

## 2019-12-16 NOTE — TELEPHONE ENCOUNTER
Reason for Call:  Other     Detailed comments: patient had shoulder work comp and today she is in pain and she is at work and she does not know what to do.    Phone Number Patient can be reached at: Home number on file [phone] (home)    Best Time: any    Can we leave a detailed message on this number? YES    Call taken on 12/16/2019 at 11:16 AM by Shawanda Robledo

## 2019-12-16 NOTE — TELEPHONE ENCOUNTER
I will refer her to orthopedics.   They will call to schedule or she can call them  Work restrictions for tomorrow:  Right arm work. No lifting with left arm.

## 2019-12-18 NOTE — TELEPHONE ENCOUNTER
This writer attempted to contact patient on 12/17/19      Reason for call informed patient message below and left detailed message.      If patient calls back:   1st floor [facility] Team (MA/TC) called. Inform patient that someone from the team will contact them, document that pt called and route to care team.         Delfina Johnson MA

## 2019-12-23 ENCOUNTER — OFFICE VISIT (OUTPATIENT)
Dept: FAMILY MEDICINE | Facility: CLINIC | Age: 24
End: 2019-12-23
Payer: OTHER MISCELLANEOUS

## 2019-12-23 VITALS
BODY MASS INDEX: 24.92 KG/M2 | DIASTOLIC BLOOD PRESSURE: 70 MMHG | OXYGEN SATURATION: 98 % | HEIGHT: 64 IN | SYSTOLIC BLOOD PRESSURE: 114 MMHG | HEART RATE: 70 BPM | RESPIRATION RATE: 20 BRPM | TEMPERATURE: 98.6 F | WEIGHT: 146 LBS

## 2019-12-23 DIAGNOSIS — S49.92XA SHOULDER INJURY, LEFT, INITIAL ENCOUNTER: Primary | ICD-10-CM

## 2019-12-23 DIAGNOSIS — Z02.6 ENCOUNTER RELATED TO WORKER'S COMPENSATION CLAIM: ICD-10-CM

## 2019-12-23 PROCEDURE — 99213 OFFICE O/P EST LOW 20 MIN: CPT | Performed by: NURSE PRACTITIONER

## 2019-12-23 ASSESSMENT — MIFFLIN-ST. JEOR: SCORE: 1407.25

## 2019-12-23 ASSESSMENT — PAIN SCALES - GENERAL: PAINLEVEL: EXTREME PAIN (8)

## 2019-12-23 NOTE — LETTER
REPORT OF WORK COMP    [address] [address]  [phone]    PATIENT DATA     Employee Name: Mike Lawton      : 1995     #: [id number]     Work related injury: Yes  Employer at time of injury:    Employer contact & phone:    Employed elsewhere? No  Workers' Compensation Carrier/Managed Care Plan:        Today's date: 2019  Date of injury: 2019  Date of first visit: 2019     PROVIDER EVALUATION: Please fill in as needed.  Please give copy to employee for employer.     1. Diagnosis: left shoulder injury     2. Treatment: ice, medication, lifting restrictions.  3. Medication: ibuprofen 600 mg q6h PRN pain  NOTE: When ordering a medication, [place] Rules require Work Comp or WC on prescriptions.     4. No work from  to  per urgent care provider. No work if unable to accommodate the below work restrictions.  5. Return to work date: 2019 but work unable to accommodate work restrictions so patient sent home on 19. Will continue current restrictions until she sees orthopedic specialist. If unable to accommodate the below work restrictions, patient will not be able to work. She will try to see orthopedic specialist this week.  ** WITH RESTRICTIONS? Yes, with work restrictions: * Restricted lifting - Maximum lift in pounds: 5  * Limited repetitive motion.    *Right hand work with left hand assist - no lifting more than 5 lb with left upper extremity  *Please allow her time to ice 15 minutes every 2 hours.  DURATION OF LIMITATIONS: 1 week        RESTRICTIONS: Unlimited unless listed.  Restrictions apply to home and leisure also.  If work restrictions is not available, the employee is totally disabled.     Maximum Medical Improvement (Date): 2020  Any Permanent Partial Disability? Deferred to future exam/consult.     Provider comments: as above     Medical Examiner: JUVE Moreau CNP           License  or registration: [id number]     Next appointment: with orthopedic specialist     CC: Employer, Managed Care Plan/Payor, Patient

## 2019-12-23 NOTE — PROGRESS NOTES
"Subjective     Mike Lawton is a 24 year old female who presents to clinic today for the following health issues:    HPI   Joint Pain    Onset: DOI  12/19/19 Work comp    Description:   Location: Left shoulder and hand  Character: sharp    Intensity: severe    Progression of Symptoms: worse    Accompanying Signs & Symptoms:  Other symptoms: tingling left hand and weakness in hand intermittent, radiation into left upper arm-new into left chest muscle    History:   Previous similar pain: YES      Precipitating factors:   Trauma or overuse: YES last week stayed home from work    Alleviating factors:  Improved by: nothing    Therapies Tried and outcome: Ice      Sent home Monday - unable to accommodate work restrictions  Pain moved into left upper chest  Pain in bicep   Hand numb and tingling intermittently  No swelling          Reviewed and updated as needed this visit by Provider         Review of Systems   ROS COMP: Constitutional, HEENT, cardiovascular, pulmonary, gi and gu systems are negative, except as otherwise noted.      Objective    /70 (BP Location: Right arm, Patient Position: Chair, Cuff Size: Adult Regular)   Pulse 70   Temp 98.6  F (37  C) (Oral)   Resp 20   Ht 1.626 m (5' 4\")   Wt 66.2 kg (146 lb)   SpO2 98%   BMI 25.06 kg/m    Body mass index is 25.06 kg/m .  Physical Exam   GENERAL: healthy, alert and no distress  MS: no gross musculoskeletal defects noted, no edema. Left deltoid tenderness. No swelling, divots or masses. Difficulty with apley scratch overhead due to pain. Empty can + on the left.  SKIN: no suspicious lesions or rashes  PSYCH: mentation appears normal, affect normal/bright    Diagnostic Test Results:  X-ray reviewed in Epic        Assessment & Plan       ICD-10-CM    1. Shoulder injury, left, initial encounter S49.92XA    2. Encounter related to worker's compensation claim [email or web address]      Ice 15 minutes 4-6 times daily  Continue ibuprofen  Work restrictions for home and " "work  Follow up with orthopedics     BMI:   Estimated body mass index is 25.06 kg/m  as calculated from the following:    Height as of this encounter: 1.626 m (5' 4\").    Weight as of this encounter: 66.2 kg (146 lb).           See Patient Instructions    No follow-ups on file.     Return precautions discussed, including when to seek urgent/emergent care.    Patient verbalizes understanding and agrees with plan of care. Patient stable for discharge.      JUVE Moreau [facility]    "

## 2019-12-23 NOTE — PATIENT INSTRUCTIONS
Health system is referring you to the Orthopedic  Services at [facility].       The  Representative will assist you in the coordination of your Orthopedic and Musculoskeletal Care as prescribed by your physician.    The  Representative will call you within 1 business day to help schedule your appointment, or you may contact the  Representative at:    All areas ~ [phone]     Type of Referral : Non Surgical / Sport Medicine       Timeframe requested: 3 - 5 days    Coverage of these services is subject to the terms and limitations of your health insurance plan.  Please call member services at your health plan with any benefit or coverage questions.      If X-rays, CT or MRI's have been performed, please contact the facility where they were done to arrange for , prior to your scheduled appointment.  Please bring this referral request to your appointment and present it to your specialist.    Ice 15 minutes 4-6 times daily  Work restrictions for home and work  Follow up with orthopedics

## 2020-01-03 ENCOUNTER — OFFICE VISIT (OUTPATIENT)
Dept: ORTHOPEDICS | Facility: CLINIC | Age: 25
End: 2020-01-03
Payer: OTHER MISCELLANEOUS

## 2020-01-03 VITALS
HEART RATE: 80 BPM | DIASTOLIC BLOOD PRESSURE: 79 MMHG | WEIGHT: 146 LBS | SYSTOLIC BLOOD PRESSURE: 116 MMHG | BODY MASS INDEX: 24.92 KG/M2 | HEIGHT: 64 IN

## 2020-01-03 DIAGNOSIS — M25.512 ACUTE PAIN OF LEFT SHOULDER: Primary | ICD-10-CM

## 2020-01-03 PROCEDURE — 99213 OFFICE O/P EST LOW 20 MIN: CPT | Performed by: FAMILY MEDICINE

## 2020-01-03 RX ORDER — METHYLPREDNISOLONE 4 MG
TABLET, DOSE PACK ORAL
Qty: 21 TABLET | Refills: 0 | Status: SHIPPED | OUTPATIENT
Start: 2020-01-03 | End: 2020-01-31

## 2020-01-03 ASSESSMENT — PAIN SCALES - GENERAL: PAINLEVEL: MODERATE PAIN (5)

## 2020-01-03 ASSESSMENT — MIFFLIN-ST. JEOR: SCORE: 1407.25

## 2020-01-03 NOTE — PROGRESS NOTES
CHIEF COMPLAINT:  Shoulder Pain (Left shoulder pain which started on December 9th. Reporting a work injury lifting heavy object. )     REFERRED BY: JUVE Reich CNP    HISTORY OF PRESENT ILLNESS  Ms. Lawton is a pleasant 24 year old year old female who presents to clinic today with acute pain of her left shoulder and upper extremity.  Patient states that injury occurred while at work on 12/9/2019.  She recalls lifting a large 70 pound coil from a shelf when pain began.  She was initially seen at an urgent care where x-rays were performed and negative.  She was instructed to follow-up with physical therapy and start conservative measures.  She subsequently followed up with her family practitioner clinic.  At that time she was urged to continue anti-inflammatories and a prescription for ibuprofen was given.  She was also given instructions for 5 pound lifting restrictions.  Unfortunately with her employer, there was no light duty available and she was unable to work.    Patient states that she continues to experience pain at the anterior and lateral aspect of left shoulder.  She has pain with overhead range of motion however there is no restriction.  She denies any substantial weakness but she does have pain which prevents her from providing adequate strength.  Pain initially was located at the left anterior and lateral shoulder concerning for rotator cuff strain.  However later in the month of December pain migrated towards the pectoral region.  This pain is subsided however.  She is also experiencing some degree of tingling from her left wrist and into her hand.  This occurs intermittently and has not presenting itself at this time.    Treatment to date is included ice, ibuprofen prescription strength, she has not gone to any physical therapy, activity modifications    No history of previous left shoulder injury.    Additional history: as documented    MEDICAL HISTORY  Patient Active Problem List  "  Diagnosis     ADHD (attention deficit hyperactivity disorder), combined type     Anxiety     Warts     Dermatitis       Current Outpatient Medications   Medication Sig Dispense Refill     methylPREDNISolone (MEDROL DOSEPAK) 4 MG tablet therapy pack Follow Package Directions 21 tablet 0     tiZANidine (ZANAFLEX) 4 MG tablet Take 1 tablet (4 mg) by mouth nightly as needed for muscle spasms 10 tablet 0       Allergies   Allergen Reactions     Penicillin G      Mother has a hx of allergie       Family History   Problem Relation Age of Onset     Hypertension Maternal Grandmother        Additional medical/Social/Surgical histories reviewed in [facility] and updated as appropriate.     REVIEW OF SYSTEMS (1/3/2020)  A 10-point review of systems was obtained and is negative except for as noted in the HPI.      PHYSICAL EXAM  /79   Pulse 80   Ht 1.626 m (5' 4\")   Wt 66.2 kg (146 lb)   BMI 25.06 kg/m      General  - normal appearance, in no obvious distress  CV  - normal radial pulse  Pulm  - normal respiratory pattern, non-labored  Musculoskeletal - shoulder  - inspection: normal bone and joint alignment, no obvious deformity, no scapular winging, no AC step-off  - palpation: Tender RC insertion, tender at anterior shoulder near bicipital groove.  Tender and hypertonic left trapezius.  normal clavicle, non-tender AC. Nontender at clavicle or pectoral region today.  - ROM:  painful and limited flexion to 150, IR full, Abduction 150 vs 180 contralaterally and ER full  - strength: 5/5  strength, 5/5 in all shoulder planes  - special tests:  (-) Speed's  (+) Neer  (+) Hawkin's  (+) Otis's pain   (-) [place]'s  (-) apprehension  (-) subscap lift-off  Neuro  - no sensory or motor deficit, grossly normal coordination, normal muscle tone  Skin  - no ecchymosis, erythema, warmth, or induration, no obvious rash  Psych  - interactive, appropriate, normal mood and affect      IMAGING :  LEFT SHOULDER THREE VIEWS  12/9/2019 1:00 " PM     HISTORY:  Pain after lifting injury today.     COMPARISON:  None.     FINDINGS:  No fracture or osseous lesion is seen. The joint spaces are  well preserved. No soft tissue pathology is seen.                                                                        IMPRESSION:  Unremarkable examination.       TAHIR SALINAS MD     ASSESSMENT & PLAN  Ms. Lawton is a 24 year old year old female who presents to clinic today with acute persisting left shoulder pain after work-related lifting injury on 12/9/19.  Diagnosis consistent with rotator cuff strain of left shoulder.  She has additional somatic dysfunction of left trapezius.  Possible symptoms of neurogenic TOS with muscular dysfunction.  Less likely radicular component from cervical region. Will continue to montitor, medrol with PT would likely address this as well.    Diagnosis: Acute pain of left shoulder    -Physical therapy referral; rotator cuff and scapular stabilization, trapezius  -Heat/ice discussed  -Tizanidine at bedtime x 1 week  -Medrol dose pack  -Work note; No lifting >5# for the next 2 weeks expires 1/17  -Follow up 2 weeks    It was a pleasure seeing Mike today.    Rivas Gipson DO, CAM  Primary Care Sports Medicine

## 2020-01-03 NOTE — LETTER
"    1/3/2020         RE: Mike Lawton  [address] [place] [address] [place]  [place] [address]        Dear Colleague,    Thank you for referring your patient, Mike Lawton, to the [facility]. Please see a copy of my visit note below.    NEW PATIENT INTAKE QUESTIONNAIRE  [facility] 1/3/2020      Mike Lawton's chief complaint for this visit includes:  Chief Complaint   Patient presents with     Shoulder Pain     Left shoulder pain which started on December 9th. Reporting a work injury lifting heavy object.      PCP: Ld Muñoz    Referring Provider:  No referring provider defined for this encounter.    /79   Pulse 80   Ht 1.626 m (5' 4\")   Wt 66.2 kg (146 lb)   BMI 25.06 kg/m     Moderate Pain (5)       Reason for Visit:    What part of your body is injured / painful?  left shoulder    What caused the injury /pain? Work injury    How long ago did your injury occur or pain begin? several months ago    What are your most bothersome symptoms? Pain    How would you characterize your symptom? aching    What makes your symptoms better? Nothing    What makes your symptoms worse? Movement    Have you been previously seen for this problem? No      Review of Systems:    Have you recently had a a fever, chills, weight loss? No    Do you have any vision problems? No    Do you have any chest pain or edema? No    Do you have any shortness of breath or wheezing?  No    Do you have stomach problems? No    Do you have any numbness or focal weakness? No    Do you have diabetes? No    Do you have problems with bleeding or clotting? No    Do you have an rashes or other skin lesions? No      CHIEF COMPLAINT:  Shoulder Pain (Left shoulder pain which started on December 9th. Reporting a work injury lifting heavy object. )     REFERRED BY: JUVE Reich CNP    HISTORY OF PRESENT ILLNESS  Ms. Lawton is a pleasant 24 year old year old female who presents to clinic today " with acute pain of her left shoulder and upper extremity.  Patient states that injury occurred while at work on 12/9/2019.  She recalls lifting a large 70 pound coil from a shelf when pain began.  She was initially seen at an urgent care where x-rays were performed and negative.  She was instructed to follow-up with physical therapy and start conservative measures.  She subsequently followed up with her family practitioner clinic.  At that time she was urged to continue anti-inflammatories and a prescription for ibuprofen was given.  She was also given instructions for 5 pound lifting restrictions.  Unfortunately with her employer, there was no light duty available and she was unable to work.    Patient states that she continues to experience pain at the anterior and lateral aspect of left shoulder.  She has pain with overhead range of motion however there is no restriction.  She denies any substantial weakness but she does have pain which prevents her from providing adequate strength.  Pain initially was located at the left anterior and lateral shoulder concerning for rotator cuff strain.  However later in the month of December pain migrated towards the pectoral region.  This pain is subsided however.  She is also experiencing some degree of tingling from her left wrist and into her hand.  This occurs intermittently and has not presenting itself at this time.    Treatment to date is included ice, ibuprofen prescription strength, she has not gone to any physical therapy, activity modifications    No history of previous left shoulder injury.    Additional history: as documented    MEDICAL HISTORY  Patient Active Problem List   Diagnosis     ADHD (attention deficit hyperactivity disorder), combined type     Anxiety     Warts     Dermatitis       Current Outpatient Medications   Medication Sig Dispense Refill     methylPREDNISolone (MEDROL DOSEPAK) 4 MG tablet therapy pack Follow Package Directions 21 tablet 0      "tiZANidine (ZANAFLEX) 4 MG tablet Take 1 tablet (4 mg) by mouth nightly as needed for muscle spasms 10 tablet 0       Allergies   Allergen Reactions     Penicillin G      Mother has a hx of allergie       Family History   Problem Relation Age of Onset     Hypertension Maternal Grandmother        Additional medical/Social/Surgical histories reviewed in [facility] and updated as appropriate.     REVIEW OF SYSTEMS (1/3/2020)  A 10-point review of systems was obtained and is negative except for as noted in the HPI.      PHYSICAL EXAM  /79   Pulse 80   Ht 1.626 m (5' 4\")   Wt 66.2 kg (146 lb)   BMI 25.06 kg/m       General  - normal appearance, in no obvious distress  CV  - normal radial pulse  Pulm  - normal respiratory pattern, non-labored  Musculoskeletal - shoulder  - inspection: normal bone and joint alignment, no obvious deformity, no scapular winging, no AC step-off  - palpation: Tender RC insertion, tender at anterior shoulder near bicipital groove.  Tender and hypertonic left trapezius.  normal clavicle, non-tender AC. Nontender at clavicle or pectoral region today.  - ROM:  painful and limited flexion to 150, IR full, Abduction 150 vs 180 contralaterally and ER full  - strength: 5/5  strength, 5/5 in all shoulder planes  - special tests:  (-) Speed's  (+) Neer  (+) Hawkin's  (+) Otis's pain   (-) [place]'s  (-) apprehension  (-) subscap lift-off  Neuro  - no sensory or motor deficit, grossly normal coordination, normal muscle tone  Skin  - no ecchymosis, erythema, warmth, or induration, no obvious rash  Psych  - interactive, appropriate, normal mood and affect      IMAGING :  LEFT SHOULDER THREE VIEWS  12/9/2019 1:00 PM     HISTORY:  Pain after lifting injury today.     COMPARISON:  None.     FINDINGS:  No fracture or osseous lesion is seen. The joint spaces are  well preserved. No soft tissue pathology is seen.                                                                        IMPRESSION:  " Unremarkable examination.       TAHIR SALINAS MD     ASSESSMENT & PLAN  Ms. Lawton is a 24 year old year old female who presents to clinic today with acute persisting left shoulder pain after work-related lifting injury on 12/9/19.  Diagnosis consistent with rotator cuff strain of left shoulder.  She has additional somatic dysfunction of left trapezius.  Possible symptoms of neurogenic TOS with muscular dysfunction.  Less likely radicular component from cervical region. Will continue to montitor, medrol with PT would likely address this as well.    Diagnosis: Acute pain of left shoulder    -Physical therapy referral; rotator cuff and scapular stabilization, trapezius  -Heat/ice discussed  -Tizanidine at bedtime x 1 week  -Medrol dose pack  -Work note; No lifting >5# for the next 2 weeks expires 1/17  -Follow up 2 weeks    It was a pleasure seeing Mike today.    Rivas Gipson DO, [facility]  Primary Care Sports Medicine      Again, thank you for allowing me to participate in the care of your patient.        Sincerely,        Rivas Gipson DO

## 2020-01-03 NOTE — LETTER
She is under my care for orthopedic injury suffered while at work January 3, 2020      RE: Mike Lawton  [address] [place] [id number]       To whom it may concern:    Mike Lawton was seen in our clinic today.  She is under my care for orthopedic injury suffered at work on 12/9/2019.  At this time I have recommended that she continue strict lifting restrictions of 5 pounds or less.  This would be compatible with sedentary duties.  If this is not applicable or able to be provided, she will be unable to work under such circumstances.  The work restrictions will continue until 1/17/2020 and will be reevaluated at that time.      Sincerely,         Rivas Gipson, DO

## 2020-01-03 NOTE — PROGRESS NOTES
"NEW PATIENT INTAKE QUESTIONNAIRE  [facility] 1/3/2020      Mike Lawton's chief complaint for this visit includes:  Chief Complaint   Patient presents with     Shoulder Pain     Left shoulder pain which started on December 9th. Reporting a work injury lifting heavy object.      PCP: Ld Muñoz    Referring Provider:  No referring provider defined for this encounter.    /79   Pulse 80   Ht 1.626 m (5' 4\")   Wt 66.2 kg (146 lb)   BMI 25.06 kg/m    Moderate Pain (5)       Reason for Visit:    What part of your body is injured / painful?  left shoulder    What caused the injury /pain? Work injury    How long ago did your injury occur or pain begin? several months ago    What are your most bothersome symptoms? Pain    How would you characterize your symptom? aching    What makes your symptoms better? Nothing    What makes your symptoms worse? Movement    Have you been previously seen for this problem? No      Review of Systems:    Have you recently had a a fever, chills, weight loss? No    Do you have any vision problems? No    Do you have any chest pain or edema? No    Do you have any shortness of breath or wheezing?  No    Do you have stomach problems? No    Do you have any numbness or focal weakness? No    Do you have diabetes? No    Do you have problems with bleeding or clotting? No    Do you have an rashes or other skin lesions? No    "

## 2020-01-03 NOTE — PATIENT INSTRUCTIONS
Thanks for coming today.  Ortho/Sports Medicine Clinic  [address] [place] [address]    To schedule future appointments in Ortho Clinic, you may call [phone].    To schedule ordered imaging by your provider:   Call Central [facility] [phone]    To schedule an injection ordered by your provider:  Call Central [facility] Injection scheduling line: [phone]  [facility] available online at:  [email or web address]    Please call if any further questions or concerns ([phone]).  Clinic hours 8 am to 5 pm.    Return to clinic (call) if symptoms worsen or fail to improve.

## 2020-01-07 ENCOUNTER — THERAPY VISIT (OUTPATIENT)
Dept: PHYSICAL THERAPY | Facility: CLINIC | Age: 25
End: 2020-01-07
Payer: OTHER MISCELLANEOUS

## 2020-01-07 DIAGNOSIS — M25.512 SHOULDER PAIN, LEFT: ICD-10-CM

## 2020-01-07 PROCEDURE — 97110 THERAPEUTIC EXERCISES: CPT | Mod: GP | Performed by: PHYSICAL THERAPIST

## 2020-01-07 PROCEDURE — 97161 PT EVAL LOW COMPLEX 20 MIN: CPT | Mod: GP | Performed by: PHYSICAL THERAPIST

## 2020-01-07 NOTE — PROGRESS NOTES
[facility] Initial Evaluation  Subjective:  The history is provided by the patient. No  was used.   Type of problem:  Left shoulder   Condition occurred with:  Lifting. This is a new condition   Problem details: 12/9/19 was lifting something at work and felt a pop.  There was initial pain so followed up with urgent care.  Completed x-rays and recommended following up with primary MD.  Was placed on a 5# lifting restriction until 1/17/20 due to work not being able to accommodate that has not been able to work.  .   Patient reports pain:  Posterior. Radiates to:  Upper arm and lower arm.  Symptoms are exacerbated by using arm behind back and using arm at shoulder level     Mike Lawton being seen for Left shoulder.   Problem began 12/9/2019. Problem occurred: Lifting  and reported as 5/10 on pain scale. General health as reported by patient is good. Pertinent medical history includes:  None.    Surgeries include:  None.  Current medications:  Anti-inflammatory.   Primary job tasks include:  Lifting/carrying and repetitive tasks.  Pain is described as shooting and is constant. Pain is the same all the time. Since onset symptoms are unchanged. Special tests:  X-ray.     Patient is [employer]. Restrictions include:  Currently not working due to present treatment.    Barriers include:  None as reported by patient.  Red flags:  None as reported by patient.                      Objective:  System                   Shoulder Evaluation:  ROM:  AROM:    Flexion:  Left:  150    Right:  175  Extension: Left: 48Right: 83  Abduction:  Left: 166   Right:  170    Internal Rotation:  Left:  T12    Right:  T8  External Rotation:  Left:  68    Right:  95                      Strength:    Flexion: Left:4/5    Pain: +    Right: 5/5     Pain:     Abduction:  Left: 4/5   Pain:+    Right: 5/5     Pain:    Internal Rotation:  Left:4+/5      Pain:+    Right: 5/5     Pain:  External Rotation:    Left:4/5      Pain:+   Right:5/5     Pain:    Horizontal Abduction:  Left:4/5      Pain:+    Right:5/5    Pain:          Special Tests:      Left shoulder negative for the following special tests:  Neural Tension    Palpation:    Left shoulder tenderness present at:  Biceps; Supraspinatus; Upper Trap and Bicipital Groove    Mobility Tests:      Glenohumeral posterior left:  Hypomobile  Glenohumeral posterior right:  Normal  Glenohumeral inferior left:  Hypomobile  Glenohumeral inferior right:  Normal                                             General     ROS    Assessment/Plan:    Patient is a 24 year old female with left side shoulder complaints.    Patient has the following significant findings with corresponding treatment plan.                Diagnosis 1:  Left shoulder  Pain -  manual therapy, self management and education  Decreased ROM/flexibility - manual therapy and therapeutic exercise  Decreased strength - therapeutic exercise and therapeutic activities  Decreased proprioception - neuro re-education and therapeutic activities  Impaired muscle performance - neuro re-education  Decreased function - therapeutic activities      Previous and current functional limitations:  (See Goal Flow Sheet for this information)    Short term and Long term goals: (See Goal Flow Sheet for this information)     Communication ability:  Patient appears to be able to clearly communicate and understand verbal and written communication and follow directions correctly.  Treatment Explanation - The following has been discussed with the patient:   RX ordered/plan of care  Anticipated outcomes  Possible risks and side effects  This patient would benefit from PT intervention to resume normal activities.   Rehab potential is good.    Frequency:  1 X week, once daily  Duration:  for 6 weeks  Discharge Plan:  Achieve all LTG.  Independent in home treatment program.  Reach maximal therapeutic benefit.    Please refer to the daily  flowsheet for treatment today, total treatment time and time spent performing 1:1 timed codes.

## 2020-01-15 ENCOUNTER — THERAPY VISIT (OUTPATIENT)
Dept: PHYSICAL THERAPY | Facility: CLINIC | Age: 25
End: 2020-01-15
Payer: OTHER MISCELLANEOUS

## 2020-01-15 DIAGNOSIS — M25.512 SHOULDER PAIN, LEFT: ICD-10-CM

## 2020-01-15 PROCEDURE — 97140 MANUAL THERAPY 1/> REGIONS: CPT | Mod: GP | Performed by: PHYSICAL THERAPIST

## 2020-01-15 PROCEDURE — 97110 THERAPEUTIC EXERCISES: CPT | Mod: GP | Performed by: PHYSICAL THERAPIST

## 2020-01-17 ENCOUNTER — OFFICE VISIT (OUTPATIENT)
Dept: ORTHOPEDICS | Facility: CLINIC | Age: 25
End: 2020-01-17
Payer: OTHER MISCELLANEOUS

## 2020-01-17 VITALS — SYSTOLIC BLOOD PRESSURE: 102 MMHG | HEART RATE: 81 BPM | OXYGEN SATURATION: 96 % | DIASTOLIC BLOOD PRESSURE: 64 MMHG

## 2020-01-17 DIAGNOSIS — M25.512 ACUTE PAIN OF LEFT SHOULDER: Primary | ICD-10-CM

## 2020-01-17 DIAGNOSIS — S46.012D ROTATOR CUFF STRAIN, LEFT, SUBSEQUENT ENCOUNTER: ICD-10-CM

## 2020-01-17 PROCEDURE — 99213 OFFICE O/P EST LOW 20 MIN: CPT | Performed by: FAMILY MEDICINE

## 2020-01-17 ASSESSMENT — PAIN SCALES - GENERAL: PAINLEVEL: SEVERE PAIN (6)

## 2020-01-17 NOTE — PROGRESS NOTES
"ESTABLISHED PATIENT FOLLOW-UP:  Pain of the Left Shoulder (PT is helping.)       HISTORY OF PRESENT ILLNESS  Ms. Lawton is a pleasant 24 year old year old female who presents to clinic today for follow-up of work-related left shoulder injury and suspected cuff strain.     Date of injury: 12/9/19    Historical 1/3/20: Injury occurred while at work on 12/9/2019.  She recalls lifting a large 70 pound coil from a shelf when pain began.  She was initially seen at an urgent care where x-rays were performed and negative.  She was instructed to follow-up with physical therapy and start conservative measures.  She subsequently followed up with her family practitioner clinic.  At that time she was urged to continue anti-inflammatories and a prescription for ibuprofen was given.  She was also given instructions for 5 pound lifting restrictions.  Unfortunately with her employer, there was no light duty available and she was unable to work.    At last visit Rx for PT prescribed, tizanidine x 1 week, and medrol pack prescribed    Date last seen: 1/03/20  Following Therapeutic Plan: Yes  Pain: Improved  Function: Improved  Interval History: Today, [place] states that PT has been helping.  Working with Tawana Aguila, DPTat [facility].  Shoulder ROM improved.  Pain down arm has resolved.  Still experiencing posterior left shoulder pain.  Tizanidine \"helped a lot\" with muscle spasms.  Also completed medrol pack without difficulty.      Patient feels she is at 50% improved from previous.  She does have concerns and would like to be able to lift heavy weight required by her job.     Additional medical/Social/Surgical histories reviewed in [facility] and updated as appropriate.    REVIEW OF SYSTEMS (1/17/2020)  CONSTITUTIONAL: Denies fever and weight loss  GASTROINTESTINAL: Denies abdominal pain, nausea, vomiting  MUSCULOSKELETAL: See HPI  SKIN: Denies any recent rash or lesion  NEUROLOGICAL: Denies numbness or focal weakness   "   PHYSICAL EXAM  /64 (BP Location: Left arm, Patient Position: Sitting, Cuff Size: Adult Regular)   Pulse 81   SpO2 96%     General  - normal appearance, in no obvious distress  Musculoskeletal - Left shoulder  - inspection: normal bone and joint alignment, no obvious deformity, no scapular winging, no AC step-off  - palpation: Tender RC insertion, tender at anterior shoulder near bicipital groove.  Tender and hypertonic left trapezius and infraspinatus.  Tender rhomboid area.   - ROM:  painful and limited flexion to 170, IR full, Abduction 160 vs 180 contralaterally and ER 75  - strength: 5/5  strength, 5/5 in all shoulder planes  - special tests:  (-) Speed's  (+) Neer  (-) Hawkin's  (+) Otis's pain, 4/5 strength  (-) [place]'s  (-) apprehension  (-) subscap lift-off  Neuro  - no sensory or motor deficit, grossly normal coordination, normal muscle tone  Skin  - no ecchymosis, erythema, warmth, or induration, no obvious rash  Psych  - interactive, appropriate, normal mood and affect     ASSESSMENT & PLAN  Ms. Lawton is a 24 year old year old female who presents to clinic today to discuss pain and debility from work-related strain of left shoulder. Notes moderate improvement over the past 2 weeks, resolution of radicular symptoms.  Discussed continuing rotator cuff and scapular stabilization program.    -Continue Physical therapy  -HEP compliance  -Refill tizanidine at bedtime PRN spasms  -Heat to shoulder  -Continue 5# restrictions for work, note provided  -Consider MRI left shoulder if pain does not improve in 2 weeks, sooner if setback  -Follow up 2 weeks.    It was a pleasure seeing Mike.    Rivas Gipson DO, [facility]  Primary Care Sports Medicine

## 2020-01-17 NOTE — NURSING NOTE
[facility] FOLLOW-UP VISIT 1/17/2020    Mike Lawton's chief complaint for this visit includes:  Chief Complaint   Patient presents with     Left Shoulder - Pain     PT is helping.     PCP: Ld Muñoz    Referring Provider:  No referring provider defined for this encounter.    /64 (BP Location: Left arm, Patient Position: Sitting, Cuff Size: Adult Regular)   Pulse 81   SpO2 96%   Severe Pain (6)       Interval History:     Follow up reason: Left shoulder injury    Date of injury: 12/9/2019    Date last seen: 1/3/2020    Following Therapeutic Plan: Yes     Pain: Improving    Function: Improving    Medical History:    Any recent changes to your medical history? No    Any new medication prescribed since last visit? No    Review of Systems:    Do you have fever, chills, weight loss? No    Do you have any vision problems? No    Do you have any chest pain or edema? No    Do you have any shortness of breath or wheezing?  No    Do you have stomach problems? No    Do you have any numbness or focal weakness? No    Do you have diabetes? No    Do you have problems with bleeding or clotting? No    Do you have an rashes or other skin lesions? No    Gudelia Marvin CMA

## 2020-01-17 NOTE — LETTER
"    1/17/2020         RE: Mike Lawton  [address] [place] [address] [place]  [place] [address]        Dear Colleague,    Thank you for referring your patient, Mike Lawton, to the [facility]. Please see a copy of my visit note below.    ESTABLISHED PATIENT FOLLOW-UP:  Pain of the Left Shoulder (PT is helping.)       HISTORY OF PRESENT ILLNESS  Ms. Lawton is a pleasant 24 year old year old female who presents to clinic today for follow-up of work-related left shoulder injury and suspected cuff strain.     Date of injury: 12/9/19    Historical 1/3/20: Injury occurred while at work on 12/9/2019.  She recalls lifting a large 70 pound coil from a shelf when pain began.  She was initially seen at an urgent care where x-rays were performed and negative.  She was instructed to follow-up with physical therapy and start conservative measures.  She subsequently followed up with her family practitioner clinic.  At that time she was urged to continue anti-inflammatories and a prescription for ibuprofen was given.  She was also given instructions for 5 pound lifting restrictions.  Unfortunately with her employer, there was no light duty available and she was unable to work.    At last visit Rx for PT prescribed, tizanidine x 1 week, and medrol pack prescribed    Date last seen: 1/03/20  Following Therapeutic Plan: Yes  Pain: Improved  Function: Improved  Interval History: Today, Mike states that PT has been helping.  Working with Tawana Aguila, DPTat [facility].  Shoulder ROM improved.  Pain down arm has resolved.  Still experiencing posterior left shoulder pain.  Tizanidine \"helped a lot\" with muscle spasms.  Also completed medrol pack without difficulty.      Patient feels she is at 50% improved from previous.  She does have concerns and would like to be able to lift heavy weight required by her job.     Additional medical/Social/Surgical histories reviewed in [facility] and updated as " appropriate.    REVIEW OF SYSTEMS (1/17/2020)  CONSTITUTIONAL: Denies fever and weight loss  GASTROINTESTINAL: Denies abdominal pain, nausea, vomiting  MUSCULOSKELETAL: See HPI  SKIN: Denies any recent rash or lesion  NEUROLOGICAL: Denies numbness or focal weakness     PHYSICAL EXAM  /64 (BP Location: Left arm, Patient Position: Sitting, Cuff Size: Adult Regular)   Pulse 81   SpO2 96%     General  - normal appearance, in no obvious distress  Musculoskeletal - Left shoulder  - inspection: normal bone and joint alignment, no obvious deformity, no scapular winging, no AC step-off  - palpation: Tender RC insertion, tender at anterior shoulder near bicipital groove.  Tender and hypertonic left trapezius and infraspinatus.  Tender rhomboid area.   - ROM:  painful and limited flexion to 170, IR full, Abduction 160 vs 180 contralaterally and ER 75  - strength: 5/5  strength, 5/5 in all shoulder planes  - special tests:  (-) Speed's  (+) Neer  (-) Hawkin's  (+) Otis's pain, 4/5 strength  (-) [place]'s  (-) apprehension  (-) subscap lift-off  Neuro  - no sensory or motor deficit, grossly normal coordination, normal muscle tone  Skin  - no ecchymosis, erythema, warmth, or induration, no obvious rash  Psych  - interactive, appropriate, normal mood and affect     ASSESSMENT & PLAN  Ms. Lawton is a 24 year old year old female who presents to clinic today to discuss pain and debility from work-related strain of left shoulder. Notes moderate improvement over the past 2 weeks, resolution of radicular symptoms.  Discussed continuing rotator cuff and scapular stabilization program.    -Continue Physical therapy  -HEP compliance  -Refill tizanidine at bedtime PRN spasms  -Heat to shoulder  -Continue 5# restrictions for work, note provided  -Consider MRI left shoulder if pain does not improve in 2 weeks, sooner if setback  -Follow up 2 weeks.    It was a pleasure seeing Mike.    Rivas Gipson, DO, CAQSM  Primary Care  Sports Medicine          Again, thank you for allowing me to participate in the care of your patient.        Sincerely,        Rivas Gipson, DO

## 2020-01-17 NOTE — LETTER
[facility]  [address]  [place] [address]  [phone]      January 17, 2020      RE: Mike Lawton  [address] [facility] [address]       To whom it may concern:    Mike Lawton was seen in our clinic today for updated visit.  She is under my care for orthopedic injury suffered at work on 12/9/2019.  Again, I have recommended that she continue strict lifting restrictions of 5 pounds or less.  This would be compatible with sedentary duties.  If this is not applicable or able to be provided, she will be unable to work under such circumstances.  The work restrictions will continue through 1/31/19 and will be reevaluated in 2 weeks.  In the meantime, she will continue with her formal rehabilitation program.      Sincerely,         Rivas Gipson, DO

## 2020-01-21 ENCOUNTER — THERAPY VISIT (OUTPATIENT)
Dept: PHYSICAL THERAPY | Facility: CLINIC | Age: 25
End: 2020-01-21
Payer: OTHER MISCELLANEOUS

## 2020-01-21 DIAGNOSIS — M25.512 SHOULDER PAIN, LEFT: ICD-10-CM

## 2020-01-21 PROCEDURE — 97140 MANUAL THERAPY 1/> REGIONS: CPT | Mod: GP | Performed by: PHYSICAL THERAPIST

## 2020-01-21 PROCEDURE — 97110 THERAPEUTIC EXERCISES: CPT | Mod: GP | Performed by: PHYSICAL THERAPIST

## 2020-01-27 ENCOUNTER — THERAPY VISIT (OUTPATIENT)
Dept: PHYSICAL THERAPY | Facility: CLINIC | Age: 25
End: 2020-01-27
Payer: OTHER MISCELLANEOUS

## 2020-01-27 DIAGNOSIS — M25.512 SHOULDER PAIN, LEFT: ICD-10-CM

## 2020-01-27 PROCEDURE — 97110 THERAPEUTIC EXERCISES: CPT | Mod: GP | Performed by: PHYSICAL THERAPIST

## 2020-01-27 PROCEDURE — 97140 MANUAL THERAPY 1/> REGIONS: CPT | Mod: GP | Performed by: PHYSICAL THERAPIST

## 2020-01-31 ENCOUNTER — OFFICE VISIT (OUTPATIENT)
Dept: ORTHOPEDICS | Facility: CLINIC | Age: 25
End: 2020-01-31
Payer: OTHER MISCELLANEOUS

## 2020-01-31 VITALS
WEIGHT: 146 LBS | BODY MASS INDEX: 24.92 KG/M2 | HEIGHT: 64 IN | DIASTOLIC BLOOD PRESSURE: 86 MMHG | SYSTOLIC BLOOD PRESSURE: 122 MMHG | HEART RATE: 77 BPM

## 2020-01-31 DIAGNOSIS — G89.11 ACUTE PAIN OF LEFT SHOULDER DUE TO TRAUMA: Primary | ICD-10-CM

## 2020-01-31 DIAGNOSIS — M25.512 ACUTE PAIN OF LEFT SHOULDER DUE TO TRAUMA: Primary | ICD-10-CM

## 2020-01-31 PROCEDURE — 99213 OFFICE O/P EST LOW 20 MIN: CPT | Performed by: FAMILY MEDICINE

## 2020-01-31 ASSESSMENT — PAIN SCALES - GENERAL: PAINLEVEL: SEVERE PAIN (6)

## 2020-01-31 ASSESSMENT — MIFFLIN-ST. JEOR: SCORE: 1407.25

## 2020-01-31 NOTE — PATIENT INSTRUCTIONS
Thanks for coming today.  Ortho[facility] Medicine Clinic  [address] [place] [address]    To schedule future appointments in Ortho Clinic, you may call [phone].    To schedule ordered imaging by your provider:   Call Central [facility] [phone]    To schedule an injection ordered by your provider:  Call [facility] Injection scheduling line: [phone]  [facility] available online at:  [email or web address]    Please call if any further questions or concerns ([phone]).  Clinic hours 8 am to 5 pm.    Return to clinic (call) if symptoms worsen or fail to improve.

## 2020-01-31 NOTE — PROGRESS NOTES
"Mike Lawton's chief complaint for this visit includes:  Chief Complaint   Patient presents with     Follow Up     Follow up for left shoulder, reporting little improvement.      PCP: Ld Muñoz    Referring Provider:  No referring provider defined for this encounter.    /86   Pulse 77   Ht 1.626 m (5' 4\")   Wt 66.2 kg (146 lb)   BMI 25.06 kg/m    Severe Pain (6)     Do you need any medication refills at today's visit? NO    "

## 2020-01-31 NOTE — LETTER
"    1/31/2020         RE: Mike Lawton  [address] [place] [address] [place]  [place] [address]        Dear Colleague,    Thank you for referring your patient, Mike Lawton, to the [facility]. Please see a copy of my visit note below.    Mike Lawton's chief complaint for this visit includes:  Chief Complaint   Patient presents with     Follow Up     Follow up for left shoulder, reporting little improvement.      PCP: Ld Muñoz    Referring Provider:  No referring provider defined for this encounter.    /86   Pulse 77   Ht 1.626 m (5' 4\")   Wt 66.2 kg (146 lb)   BMI 25.06 kg/m     Severe Pain (6)     Do you need any medication refills at today's visit? NO      ESTABLISHED PATIENT FOLLOW-UP:  Follow Up (Follow up for left shoulder, reporting little improvement. )       HISTORY OF PRESENT ILLNESS  Ms. Lawton is a pleasant 24 year old year old female who presents to clinic today for follow-up of left shoulder pain.  Marilyn notes that she is reached a plateau in her rehabilitation.  She continues to experience left shoulder pain with strengthening activities.  She has been able to improve her range of motion greatly and has noted minimal pain with general weightless range.  During physical therapy last week, she had been having particular difficulty with band exercises.  Her therapist did reach out and discussed that there are continued findings concerning for labral pathology which are not improving.     At last visit she had been making some strides towards improvement in her range of motion had improved, however it is the strengthening that is been making no progress.    Date of injury: December 9, 2019  Date last seen: 2 weeks ago  Following Therapeutic Plan: Yes  Pain: Unchanged, reaches up to a 6/10 at times  Function: Improved range of motion overall, unable to lift heavy objects or perform strength PT activity without " "pain.    Treatments:  PT  Tizanidine  Ibuprofen  Medrol Pack  Heat therapy  Activity modification/off work  HEP    Additional medical/Social/Surgical [facility] and updated as appropriate.    REVIEW OF SYSTEMS (1/31/2020)  CONSTITUTIONAL: Denies fever and weight loss  GASTROINTESTINAL: Denies abdominal pain, nausea, vomiting  MUSCULOSKELETAL: See HPI  SKIN: Denies any recent rash or lesion  NEUROLOGICAL: Denies numbness or focal weakness     PHYSICAL EXAM  /86   Pulse 77   Ht 1.626 m (5' 4\")   Wt 66.2 kg (146 lb)   BMI 25.06 kg/m       General  - normal appearance, in no obvious distress  Musculoskeletal -left shoulder  - inspection: normal bone and joint alignment, no obvious deformity, no scapular winging, no AC step-off  - palpation: Tenderness palpation at anterior and posterior joint lines, tenderness palpation at the greater tuberosity near insertion of the supraspinatus tendon  - ROM: Range of motion in forward elevation abduction external and internal rotation are full and generally painless.  - strength: 5/5  strength, 5/5 in all shoulder planes  - special tests:  (-) Speed's  (-) Neer  (+) Hawkin's  (-) Otis's  (+) [place]'s  (+) load & shift, seated  (+) Dynamic labral shear test  (+) Crank test  (+) apprehension relocation test  (-) subscap lift-off  Neuro  - no sensory or motor deficit, grossly normal coordination, normal muscle tone  Skin  - no ecchymosis, erythema, warmth, or induration, no obvious rash  Psych  - interactive, appropriate, normal mood and affect    IMAGING :   LEFT SHOULDER THREE VIEWS  12/9/2019 1:00 PM     HISTORY:  Pain after lifting injury today.     COMPARISON:  None.     FINDINGS:  No fracture or osseous lesion is seen. The joint spaces are  well preserved. No soft tissue pathology is seen.                                                                        IMPRESSION:  Unremarkable examination.       TAHIR SALINAS MD     ASSESSMENT & " PLAN  Ms. Lawton is a 24 year old year old female who presents to clinic today for reevaluation of ongoing left shoulder pain despite her best efforts with physical therapy, rest, ice and NSAIDs.  At this time MRI is warranted to further evaluate shoulder pathology including labrum.  She may hold off on PT this week and until MRI is completed.  At that time we may have a better direction to address specifics with physical therapy and/or determine other course of action.  I have also referred her onto 1 of our shoulder surgeons as she is not making progress to date.  Work note was provided providing with additional 2 weeks off given that she has not made improvement to return to heavy lifting activities which is the only task that they have been able to offer her.    It was a pleasure seeing Mike.    Rivas Gipson DO, [facility]  Primary Care Sports Medicine          Again, thank you for allowing me to participate in the care of your patient.        Sincerely,        Rivas Gipson DO

## 2020-01-31 NOTE — LETTER
2020      RE: Mike Lawton  [address] [place] [address] [place]  [place] [address]       To whom it may concern:    Mike Lawton was seen in our clinic today and reevaluated for left shoulder injury suffered at work back on 2020.  She has been extremely diligent with her rehabilitation program and physical therapy.  However unfortunate this time she continues to have significant deficits and pain.  Ongoing diagnostic work-up is being undertaken at this time.  I would like her to take the next 2 weeks away from work if there is no light duty options available.  This set of restrictions will  on 2020.  She will be reevaluated prior to this date and a new set of active modifications/work restrictions will be provided at that time.    Sincerely,          Rivas Gipson DO

## 2020-01-31 NOTE — PROGRESS NOTES
"ESTABLISHED PATIENT FOLLOW-UP:  Follow Up (Follow up for left shoulder, reporting little improvement. )       HISTORY OF PRESENT ILLNESS  Ms. Lawton is a pleasant 24 year old year old female who presents to clinic today for follow-up of left shoulder pain.  Marilyn notes that she is reached a plateau in her rehabilitation.  She continues to experience left shoulder pain with strengthening activities.  She has been able to improve her range of motion greatly and has noted minimal pain with general weightless range.  During physical therapy last week, she had been having particular difficulty with band exercises.  Her therapist did reach out and discussed that there are continued findings concerning for labral pathology which are not improving.     At last visit she had been making some strides towards improvement in her range of motion had improved, however it is the strengthening that is been making no progress.    Date of injury: December 9, 2019  Date last seen: 2 weeks ago  Following Therapeutic Plan: Yes  Pain: Unchanged, reaches up to a 6/10 at times  Function: Improved range of motion overall, unable to lift heavy objects or perform strength PT activity without pain.    Treatments:  PT  Tizanidine  Ibuprofen  Medrol Pack  Heat therapy  Activity modification/off work  HEP    Additional medical/Social/Surgical histories reviewed in [facility] and updated as appropriate.    REVIEW OF SYSTEMS (1/31/2020)  CONSTITUTIONAL: Denies fever and weight loss  GASTROINTESTINAL: Denies abdominal pain, nausea, vomiting  MUSCULOSKELETAL: See HPI  SKIN: Denies any recent rash or lesion  NEUROLOGICAL: Denies numbness or focal weakness     PHYSICAL EXAM  /86   Pulse 77   Ht 1.626 m (5' 4\")   Wt 66.2 kg (146 lb)   BMI 25.06 kg/m      General  - normal appearance, in no obvious distress  Musculoskeletal -left shoulder  - inspection: normal bone and joint alignment, no obvious deformity, no scapular winging, no AC step-off  - " palpation: Tenderness palpation at anterior and posterior joint lines, tenderness palpation at the greater tuberosity near insertion of the supraspinatus tendon  - ROM: Range of motion in forward elevation abduction external and internal rotation are full and generally painless.  - strength: 5/5  strength, 5/5 in all shoulder planes  - special tests:  (-) Speed's  (-) Neer  (+) Hawkin's  (-) Otis's  (+) [place]'s  (+) Dynamic labral shear test  (+) Crank test  (-) apprehension relocation test  (-) subscap lift-off  Neuro  - no sensory or motor deficit, grossly normal coordination, normal muscle tone  Skin  - no ecchymosis, erythema, warmth, or induration, no obvious rash  Psych  - interactive, appropriate, normal mood and affect    IMAGING :   LEFT SHOULDER THREE VIEWS  12/9/2019 1:00 PM     HISTORY:  Pain after lifting injury today.     COMPARISON:  None.     FINDINGS:  No fracture or osseous lesion is seen. The joint spaces are  well preserved. No soft tissue pathology is seen.                                                                        IMPRESSION:  Unremarkable examination.       TAHIR SALINAS MD     ASSESSMENT & PLAN  Ms. Lawton is a 24 year old year old female who presents to clinic today for reevaluation of ongoing left shoulder pain despite her best efforts with physical therapy, rest, ice and NSAIDs.  At this time MRI is warranted to further evaluate shoulder pathology including labrum.  She may hold off on PT this week and until MRI is completed.  At that time we may have a better direction to address specifics with physical therapy and/or determine other course of action.  I have also referred her onto 1 of our shoulder surgeons as she is not making progress to date.  Work note was provided providing with additional 2 weeks off given that she has not made improvement to return to heavy lifting activities which is the only task that they have been able to offer her.    It was a  pleasure seeing Mike.    Rivas Gipson DO, CAQSM  Primary Care Sports Medicine

## 2020-02-03 ENCOUNTER — ANCILLARY PROCEDURE (OUTPATIENT)
Dept: MRI IMAGING | Facility: CLINIC | Age: 25
End: 2020-02-03
Attending: FAMILY MEDICINE
Payer: OTHER MISCELLANEOUS

## 2020-02-03 DIAGNOSIS — G89.11 ACUTE PAIN OF LEFT SHOULDER DUE TO TRAUMA: ICD-10-CM

## 2020-02-03 DIAGNOSIS — M25.512 ACUTE PAIN OF LEFT SHOULDER DUE TO TRAUMA: ICD-10-CM

## 2020-02-03 PROCEDURE — 73221 MRI JOINT UPR EXTREM W/O DYE: CPT | Mod: LT | Performed by: RADIOLOGY

## 2020-02-13 ENCOUNTER — OFFICE VISIT (OUTPATIENT)
Dept: ORTHOPEDICS | Facility: CLINIC | Age: 25
End: 2020-02-13
Attending: FAMILY MEDICINE
Payer: OTHER MISCELLANEOUS

## 2020-02-13 VITALS
SYSTOLIC BLOOD PRESSURE: 125 MMHG | WEIGHT: 140.9 LBS | HEIGHT: 65 IN | BODY MASS INDEX: 23.47 KG/M2 | OXYGEN SATURATION: 98 % | DIASTOLIC BLOOD PRESSURE: 77 MMHG | HEART RATE: 88 BPM

## 2020-02-13 DIAGNOSIS — M71.9 DISORDER OF BURSAE AND TENDONS IN SHOULDER REGION: Primary | ICD-10-CM

## 2020-02-13 DIAGNOSIS — M67.919 DISORDER OF BURSAE AND TENDONS IN SHOULDER REGION: Primary | ICD-10-CM

## 2020-02-13 PROCEDURE — 99203 OFFICE O/P NEW LOW 30 MIN: CPT | Performed by: ORTHOPAEDIC SURGERY

## 2020-02-13 ASSESSMENT — MIFFLIN-ST. JEOR: SCORE: 1392.06

## 2020-02-13 ASSESSMENT — PAIN SCALES - GENERAL: PAINLEVEL: SEVERE PAIN (7)

## 2020-02-13 NOTE — LETTER
Mike Lawton     1995  Encounter date/time:  02/13/20       Report of Workability    Physician:  Gloria Fowler MD    Date of Injury:  12/9/19  Diagnosis:  Left shoulder injury    Limitations:  No repetitive shoulder height or overhead work.  No lift/push/pull away from body with left arm more than 10 pounds.      Return to work status: Return to work WITH limitations above.    Follow-Up:   Return to clinic in 4 weeks or prn.

## 2020-02-13 NOTE — LETTER
2/13/2020         RE: Mike Lawton  [address] [place] [address] [place]  [place] [address]        Dear Colleague,    Thank you for referring your patient, Mike Lawton, to the [facility]. Please see a copy of my visit note below.    CHIEF CONCERN:  Left shoulder pain (work injury)    HISTORY OF PRESENT ILLNESS:  Mike is a pleasant 24 year old right hand dominant female who presents to the clinic for the above complaint.  Pain began on 12/09/2019 when patient was lifting heavy coils at work. She notes that she was lifting from the bottom shelf. She felt a pop and had pain. When pain did not improve, she was seen in urgent care and an x-ray was ordered that was unremarkable. She then saw her PCP who started patient on NSAIDs, recommended icing, 5 lb weight restriction and a referral to orthopedics.   It was recommended she start physical therapy.   Patient was evaluated by Dr. Gipson who again recommended patient start physical therapy, continue with 5 lb lifting restriction and begin a course of tizanidine and a medrol dose pack.  After 4 visits of physical therapy she had reached a plateau in recovery of strength.  She feels her range of motion has improved.  She has intermittent sharp pain that dulls to an ache and will last for a day or two. Due to continued pain and continued decreased strength, an MRI was ordered that was reported to be normal. Pain today is aching in nature and a 7 on scale of 1-10.    Past Medical History reviewed in EMR    Current Outpatient Medications   Medication Sig Dispense Refill     tiZANidine (ZANAFLEX) 4 MG tablet Take 1 tablet (4 mg) by mouth nightly as needed for muscle spasms 10 tablet 0          Allergies   Allergen Reactions     Penicillin G      Mother has a hx of allergie       SOCIAL HISTORY:    Social History     Socioeconomic History     Marital status: Single     Spouse name: Not on file     Number of children: Not on file     Years of education:  Not on file     Highest education level: Not on file   Occupational History     Not on file   Social Needs     Financial resource strain: Not on file     Food insecurity:     Worry: Not on file     Inability: Not on file     Transportation needs:     Medical: Not on file     Non-medical: Not on file   Tobacco Use     Smoking status: Never Smoker     Smokeless tobacco: Never Used   Substance and Sexual Activity     Alcohol use: No     Drug use: No     Sexual activity: Never   Lifestyle     Physical activity:     Days per week: Not on file     Minutes per session: Not on file     Stress: Not on file   Relationships     Social connections:     Talks on phone: Not on file     Gets together: Not on file     Attends [employer] service: Not on file     Active member of club or organization: Not on file     Attends meetings of clubs or organizations: Not on file     Relationship status: Not on file     Intimate partner violence:     Fear of current or ex partner: Not on file     Emotionally abused: Not on file     Physically abused: Not on file     Forced sexual activity: Not on file   Other Topics Concern     Not on file   Social History Narrative     Not on file       FAMILY HISTORY: Reviewed in EMR      REVIEW OF SYSTEMS: Positive for that noted in past medical history and history of present illness and otherwise reviewed in EMR     PHYSICAL EXAM:    Adult female in no acute distress. Articulates and communicates with normal affect.  Respirations even and unlabored  Focused upper extremity exam: Skin intact. No erythema. Sensation intact all dermatomes into the hand to light touch. EPL, FPL, and Intrinsics intact. Right shoulder active motion is FE to 175, ER at side to 90, and IR to T6. Left shoulder active motion is FE to 175, ER to 75, and IR to T6.  Negative Neer and Jeronimo. No pain on palpation over the AC joint. No pain on palpation over the long head of the biceps. FE and ER strength 5/5. Neg Speeds.      IMAGING:  Left shoulder XR from 12/9/19 were negative for GH or OA arthrosis.     Left shoulder MRI 2/3/20 was reviewed and I agree with the impression below:  Impression:  1.  No MR evidence of labral tear on this nonarthrographic study.  2.  No full-thickness rotator cuff tears.    ASSESSMENT:    1. Left shoulder pain, work injury    PLAN:  I reviewed the exam and imaging findings with the patient. We discussed that I would encourage continued PT. I will review with Tawana. I provided a new work order. Patient will follow up in 4 weeks to eval progress and revisit work restrictions.    Gloria Fowler MD      Again, thank you for allowing me to participate in the care of your patient.        Sincerely,        Gloria Fowler MD

## 2020-02-13 NOTE — NURSING NOTE
"Mike Lawton's chief complaint for this visit includes:  Chief Complaint   Patient presents with     Left Shoulder - Pain     PCP: Ld Muñoz    Referring Provider:  Rivas Gipson DO  [address] [place] [id number]    /77 (BP Location: Left arm, Patient Position: Sitting, Cuff Size: Adult Regular)   Pulse 88   Ht 1.638 m (5' 4.5\")   Wt 63.9 kg (140 lb 14.4 oz)   SpO2 98%   BMI 23.81 kg/m    Severe Pain (7)     Do you need any medication refills at today's visit? No    Right hand dominant    Gudelia Marvin CMA        "

## 2020-02-13 NOTE — PROGRESS NOTES
CHIEF CONCERN:  Left shoulder pain (work injury)    HISTORY OF PRESENT ILLNESS:  Mike is a pleasant 24 year old right hand dominant female who presents to the clinic for the above complaint.  Pain began on 12/09/2019 when patient was lifting heavy coils at work. She notes that she was lifting from the bottom shelf. She felt a pop and had pain. When pain did not improve, she was seen in urgent care and an x-ray was ordered that was unremarkable. She then saw her PCP who started patient on NSAIDs, recommended icing, 5 lb weight restriction and a referral to orthopedics.   It was recommended she start physical therapy.   Patient was evaluated by Dr. Gipson who again recommended patient start physical therapy, continue with 5 lb lifting restriction and begin a course of tizanidine and a medrol dose pack.  After 4 visits of physical therapy she had reached a plateau in recovery of strength.  She feels her range of motion has improved.  She has intermittent sharp pain that dulls to an ache and will last for a day or two. Due to continued pain and continued decreased strength, an MRI was ordered that was reported to be normal. Pain today is aching in nature and a 7 on scale of 1-10.    Past Medical History reviewed in EMR    Current Outpatient Medications   Medication Sig Dispense Refill     tiZANidine (ZANAFLEX) 4 MG tablet Take 1 tablet (4 mg) by mouth nightly as needed for muscle spasms 10 tablet 0          Allergies   Allergen Reactions     Penicillin G      Mother has a hx of allergie       SOCIAL HISTORY:    Social History     Socioeconomic History     Marital status: Single     Spouse name: Not on file     Number of children: Not on file     Years of education: Not on file     Highest education level: Not on file   Occupational History     Not on file   Social Needs     Financial resource strain: Not on file     Food insecurity:     Worry: Not on file     Inability: Not on file     Transportation needs:      Medical: Not on file     Non-medical: Not on file   Tobacco Use     Smoking status: Never Smoker     Smokeless tobacco: Never Used   Substance and Sexual Activity     Alcohol use: No     Drug use: No     Sexual activity: Never   Lifestyle     Physical activity:     Days per week: Not on file     Minutes per session: Not on file     Stress: Not on file   Relationships     Social connections:     Talks on phone: Not on file     Gets together: Not on file     Attends Christianity service: Not on file     Active member of club or organization: Not on file     Attends meetings of clubs or organizations: Not on file     Relationship status: Not on file     Intimate partner violence:     Fear of current or ex partner: Not on file     Emotionally abused: Not on file     Physically abused: Not on file     Forced sexual activity: Not on file   Other Topics Concern     Not on file   Social History Narrative     Not on file       FAMILY HISTORY: Reviewed in EMR      REVIEW OF SYSTEMS: Positive for that noted in past medical history and history of present illness and otherwise reviewed in EMR     PHYSICAL EXAM:    Adult female in no acute distress. Articulates and communicates with normal affect.  Respirations even and unlabored  Focused upper extremity exam: Skin intact. No erythema. Sensation intact all dermatomes into the hand to light touch. EPL, FPL, and Intrinsics intact. Right shoulder active motion is FE to 175, ER at side to 90, and IR to T6. Left shoulder active motion is FE to 175, ER to 75, and IR to T6.  Negative Neer and Jeronimo. No pain on palpation over the AC joint. No pain on palpation over the long head of the biceps. FE and ER strength 5/5. Neg Speeds.     IMAGING:  Left shoulder XR from 12/9/19 were negative for GH or OA arthrosis.     Left shoulder MRI 2/3/20 was reviewed and I agree with the impression below:  Impression:  1.  No MR evidence of labral tear on this nonarthrographic study.  2.  No  full-thickness rotator cuff tears.    ASSESSMENT:    1. Left shoulder pain, work injury    PLAN:  I reviewed the exam and imaging findings with the patient. We discussed that I would encourage continued PT. I will review with Tawana. I provided a new work order. Patient will follow up in 4 weeks to eval progress and revisit work restrictions.    Gloria Fowler MD

## 2020-02-13 NOTE — PATIENT INSTRUCTIONS
Thanks for coming today.  Ortho/Sports Medicine Clinic  [address] [place] [address]    To schedule future appointments in Ortho Clinic, you may call [phone].    To schedule ordered imaging by your provider:   Call [facility] [phone]    To schedule an injection ordered by your provider:  Call [facility] Injection scheduling line: [phone]  [employer] available online at:  [email or web address]    Please call if any further questions or concerns ([phone]).  Clinic hours 8 am to 5 pm.    Return to clinic (call) if symptoms worsen or fail to improve.

## 2020-02-20 ENCOUNTER — TELEPHONE (OUTPATIENT)
Dept: ORTHOPEDICS | Facility: CLINIC | Age: 25
End: 2020-02-20

## 2020-02-20 NOTE — TELEPHONE ENCOUNTER
[facility] Call Center    Phone Message    May a detailed message be left on voicemail: yes     Reason for Call: Other: work comp  would like to know if pt was released to full duty work at the last visit. please advise     Action Taken: Message routed to:  Adult Clinics: Orthopedics p [phone]

## 2020-02-20 NOTE — TELEPHONE ENCOUNTER
Faxed most recent workability letter with restrictions from 02/13/2020 to Alejandrina at [phone].

## 2020-02-21 ENCOUNTER — OFFICE VISIT (OUTPATIENT)
Dept: URGENT CARE | Facility: URGENT CARE | Age: 25
End: 2020-02-21
Payer: COMMERCIAL

## 2020-02-21 VITALS
DIASTOLIC BLOOD PRESSURE: 74 MMHG | RESPIRATION RATE: 14 BRPM | SYSTOLIC BLOOD PRESSURE: 117 MMHG | OXYGEN SATURATION: 98 % | BODY MASS INDEX: 25.42 KG/M2 | TEMPERATURE: 98.2 F | WEIGHT: 150.4 LBS | HEART RATE: 85 BPM

## 2020-02-21 DIAGNOSIS — R50.9 FEVER IN ADULT: ICD-10-CM

## 2020-02-21 DIAGNOSIS — R07.0 THROAT PAIN: Primary | ICD-10-CM

## 2020-02-21 LAB
DEPRECATED S PYO AG THROAT QL EIA: NEGATIVE
FLUAV+FLUBV AG SPEC QL: NEGATIVE
FLUAV+FLUBV AG SPEC QL: NEGATIVE
SPECIMEN SOURCE: NORMAL
STREP GROUP A PCR: NOT DETECTED

## 2020-02-21 PROCEDURE — 99213 OFFICE O/P EST LOW 20 MIN: CPT | Performed by: PHYSICIAN ASSISTANT

## 2020-02-21 PROCEDURE — 87804 INFLUENZA ASSAY W/OPTIC: CPT | Performed by: PHYSICIAN ASSISTANT

## 2020-02-21 PROCEDURE — 87651 STREP A DNA AMP PROBE: CPT | Performed by: PHYSICIAN ASSISTANT

## 2020-02-21 PROCEDURE — 40001204 ZZHCL STATISTIC STREP A RAPID: Performed by: PHYSICIAN ASSISTANT

## 2020-02-21 NOTE — PROGRESS NOTES
S: 24-year-old female presents for evaluation of sore throat for 48 hours.  Mild cough.  No body aches.  Some headache.  Fever yesterday 101.  No vomiting or diarrhea.  No rash.    Allergies   Allergen Reactions     Penicillin G      Mother has a hx of allergie       History reviewed. No pertinent past medical history.    tiZANidine (ZANAFLEX) 4 MG tablet, Take 1 tablet (4 mg) by mouth nightly as needed for muscle spasms (Patient not taking: Reported on 2/13/2020)    No current facility-administered medications on file prior to visit.       Social History     Tobacco Use     Smoking status: Never Smoker     Smokeless tobacco: Never Used   Substance Use Topics     Alcohol use: No       ROS:  CONSTITUTIONAL: Negative for fatigue or fever.  EYES: Negative for eye problems.  ENT: As above.  RESP: As above.  CV: Negative for chest pains.  GI: Negative for vomiting.  MUSCULOSKELETAL:  Negative for significant muscle or joint pains.  NEUROLOGIC: Negative for headaches.  SKIN: Negative for rash.  PSYCH: Normal mentation for age.    OBJECTIVE:  /74   Pulse 85   Temp 98.2  F (36.8  C) (Oral)   Resp 14   Wt 68.2 kg (150 lb 6.4 oz)   SpO2 98%   BMI 25.42 kg/m    GENERAL APPEARANCE: Healthy, alert and no distress.  EYES:Conjunctiva/sclera clear.  EARS: No cerumen.   Ear canals w/o erythema.  TM's intact w/o erythema.    NOSE/MOUTH: Nose without ulcers, erythema or lesions.  SINUSES: No maxillary sinus tenderness.  THROAT: Mild erythema with 1+ tonsillar enlargement bilaterally.  Looks like some food particles in the tonsillar crypts on the right.    NECK: Supple, nontender, no lymphadenopathy.  RESP: Lungs clear to auscultation - no rales, rhonchi or wheezes  CV: Regular rate and rhythm, normal S1 S2, no murmur noted.  NEURO: Awake, alert    SKIN: No rashes  Results for orders placed or performed in visit on 02/21/20   Streptococcus A Rapid Scr w Reflx to PCR     Status: None   Result Value Ref Range    Strep  Specimen Description Throat     Streptococcus Group A Rapid Screen Negative NEG^Negative   Influenza A/B antigen     Status: None   Result Value Ref Range    Influenza A/B Agn Specimen Nasal     Influenza A Negative NEG^Negative    Influenza B Negative NEG^Negative           ASSESSMENT:     ICD-10-CM    1. Throat pain R07.0 Streptococcus A Rapid Scr w Reflx to PCR   2. Fever in adult R50.9 Influenza A/B antigen     Group A Streptococcus PCR Throat Swab           PLAN: Salt water gargles.  Ibuprofen.  Further strep test is pending and will contact with results.   I have discussed clinical findings with patient.  Symptomatic care is discussed.  I have discussed the possibility of  worsening symptoms and indication to RTC or go to the ER if they occur.  All questions are answered, patient indicates understanding of these issues and is in agreement with plan.   Patient care instructions are discussed/given at the end of visit.   Lots of rest and fluids.    Addis Ball PA-C

## 2020-03-02 ENCOUNTER — HEALTH MAINTENANCE LETTER (OUTPATIENT)
Age: 25
End: 2020-03-02

## 2020-03-03 ENCOUNTER — THERAPY VISIT (OUTPATIENT)
Dept: PHYSICAL THERAPY | Facility: CLINIC | Age: 25
End: 2020-03-03
Payer: OTHER MISCELLANEOUS

## 2020-03-03 DIAGNOSIS — M25.512 SHOULDER PAIN, LEFT: ICD-10-CM

## 2020-03-03 PROCEDURE — 97112 NEUROMUSCULAR REEDUCATION: CPT | Mod: GP | Performed by: PHYSICAL THERAPIST

## 2020-03-03 PROCEDURE — 97110 THERAPEUTIC EXERCISES: CPT | Mod: GP | Performed by: PHYSICAL THERAPIST

## 2020-03-03 NOTE — PROGRESS NOTES
Subjective:  HPI  Physical Exam                    Objective:  System    Physical Exam    General     ROS    Assessment/Plan:    PROGRESS  REPORT    Progress reporting period is from 1/7/20 to 3/3/20.       SUBJECTIVE  Subjective changes noted by patient:  Patient reports the shoulder continues to have good and bad days.  There is not anything specific that triggers the pain.  When the pain is present there is throbbing in the posterior shoulder which increases with movement of the shoulder.  Has been focusing on the doorway stretch over the past few weeks.  Is currently on work restrictions with no pulling/pushing materials; no overhead lifting.    Current pain level is 6/10.     Previous pain level was  5/10.   Changes in function:  Yes (See Goal flowsheet attached for changes in current functional level)  Adverse reaction to treatment or activity: None    OBJECTIVE  Changes noted in objective findings:  Yes,     AROM   Flexion 165 with pain at end range   Extension 60   Abduction 160 with moderate increased pain at end-range   IR T8 pain at end-range   ER 72     STRENGTH   Flexion 4+/5   Abduction 4+/5   IR 4+/5   ER 4/5     ASSESSMENT/PLAN  Updated problem list and treatment plan: Diagnosis 1:  Left shoulder pain  Pain -  self management and education  Decreased ROM/flexibility - manual therapy and therapeutic exercise  Decreased strength - therapeutic exercise and therapeutic activities  Impaired muscle performance - neuro re-education  Decreased function - therapeutic activities  STG/LTGs have been met or progress has been made towards goals:  Yes (See Goal flow sheet completed today.)  Assessment of Progress: The patient's condition has potential to improve.  Self Management Plans:  Patient has been instructed in a home treatment program.  Patient  has been instructed in self management of symptoms.  I have re-evaluated this patient and find that the nature, scope, duration and intensity of the therapy is  appropriate for the medical condition of the patient.  Mike continues to require the following intervention to meet STG and LTG's:  PT    Recommendations:  This patient would benefit from continued therapy.     Frequency:  1 X week, once daily  Duration:  for 7 weeks-will ask for 6 more visits after MD visit next week        Please refer to the daily flowsheet for treatment today, total treatment time and time spent performing 1:1 timed codes.

## 2020-03-06 ENCOUNTER — TELEPHONE (OUTPATIENT)
Dept: ORTHOPEDICS | Facility: CLINIC | Age: 25
End: 2020-03-06

## 2020-03-06 NOTE — TELEPHONE ENCOUNTER
3/6 Explained we need to reschedule appointment on 3/12 to either earlier or later in the day. Provided phone number [phone] to reschedule.     Mandi Jones   Procedure    Ortho/Sports Med/Ent/Eye   [facility]   [phone]

## 2020-03-12 ENCOUNTER — OFFICE VISIT (OUTPATIENT)
Dept: ORTHOPEDICS | Facility: CLINIC | Age: 25
End: 2020-03-12
Payer: OTHER MISCELLANEOUS

## 2020-03-12 ENCOUNTER — THERAPY VISIT (OUTPATIENT)
Dept: PHYSICAL THERAPY | Facility: CLINIC | Age: 25
End: 2020-03-12
Payer: OTHER MISCELLANEOUS

## 2020-03-12 VITALS
DIASTOLIC BLOOD PRESSURE: 69 MMHG | HEART RATE: 77 BPM | BODY MASS INDEX: 25.47 KG/M2 | WEIGHT: 150.7 LBS | SYSTOLIC BLOOD PRESSURE: 105 MMHG | OXYGEN SATURATION: 97 %

## 2020-03-12 DIAGNOSIS — M25.512 SHOULDER PAIN, LEFT: ICD-10-CM

## 2020-03-12 DIAGNOSIS — G25.89 SCAPULAR DYSKINESIS: Primary | ICD-10-CM

## 2020-03-12 PROCEDURE — 99213 OFFICE O/P EST LOW 20 MIN: CPT | Performed by: ORTHOPAEDIC SURGERY

## 2020-03-12 PROCEDURE — 97112 NEUROMUSCULAR REEDUCATION: CPT | Mod: GP | Performed by: PHYSICAL THERAPIST

## 2020-03-12 PROCEDURE — 97110 THERAPEUTIC EXERCISES: CPT | Mod: GP | Performed by: PHYSICAL THERAPIST

## 2020-03-12 ASSESSMENT — PAIN SCALES - GENERAL: PAINLEVEL: SEVERE PAIN (7)

## 2020-03-12 NOTE — NURSING NOTE
Mike Lawton's chief complaint for this visit includes:  Chief Complaint   Patient presents with     Left Shoulder - Pain     work injury of left shoulder DOI: 12/9/2019     PCP: Ld Muñoz    Referring Provider:  Rivas Gipson DO  [address] [place] [id number]    /69 (BP Location: Left arm, Patient Position: Sitting, Cuff Size: Adult Regular)   Pulse 77   Wt 68.4 kg (150 lb 11.2 oz)   SpO2 97%   BMI 25.47 kg/m    Severe Pain (7)     Do you need any medication refills at today's visit? No    Gudelia Marvin, CMA

## 2020-03-12 NOTE — LETTER
"    3/12/2020         RE: Mike Lawton  [address] [place] [address] [place]  [place] [address]        Dear Colleague,    Thank you for referring your patient, Mike Lawton, to the [facility]. Please see a copy of my visit note below.    CHIEF CONCERN:  Left shoulder pain (work injury)    HISTORY OF PRESENT ILLNESS: Mike is a pleasant 24 year old right hand dominant female who is following up after starting physical therapy.  She has had one physical therapy visit with Tawana at [facility]. She had several appointments cancelled by [facility] due to illness. She has been doing home exercises once daily.  She feels the PT exercies are helping slightly, however wall push-ups and band rowing aggravate her shoulder. Pain today is a 7 on a scale of 1-10.      PHYSICAL EXAM:    Adult female in no acute distress. Articulates and communicates with normal affect.  Respirations even and unlabored  Focused upper extremity exam: No/minimal asymmetry at rest though left shoulder is in \"rolled forward\" posture. Left shoulder AROM is full and unchanged from last visit. WIth forward elevation of L shoulder does demonstrate notable scapular dyskinsia.    IMAGING:  None new    ASSESSMENT:  1. Left shoulder pain, work injury  2. Left shoulder scapular dyskinesia    PLAN:  I reviewed that I would encourage continued PT. I showed patient her scapular dysfunction using a mirror in the PT department today and patient was able to see this. I advised patient that this should be improved/resolved with dedicated shoulder rehab program. If dedicated shoulder PT over next 6 weeks does not improve symptoms she should return to revisit next steps.     Gloria Fowler MD    Again, thank you for allowing me to participate in the care of your patient.        Sincerely,        Gloria Fowler MD    "

## 2020-03-12 NOTE — PROGRESS NOTES
"CHIEF CONCERN:  Left shoulder pain (work injury)    HISTORY OF PRESENT ILLNESS: Mike is a pleasant 24 year old right hand dominant female who is following up after starting physical therapy.  She has had one physical therapy visit with Tawana at [facility]. She had several appointments cancelled by [facility] due to illness. She has been doing home exercises once daily.  She feels the PT exercies are helping slightly, however wall push-ups and band rowing aggravate her shoulder. Pain today is a 7 on a scale of 1-10.      PHYSICAL EXAM:    Adult female in no acute distress. Articulates and communicates with normal affect.  Respirations even and unlabored  Focused upper extremity exam: No/minimal asymmetry at rest though left shoulder is in \"rolled forward\" posture. Left shoulder AROM is full and unchanged from last visit. WIth forward elevation of L shoulder does demonstrate notable scapular dyskinsia.    IMAGING:  None new    ASSESSMENT:  1. Left shoulder pain, work injury  2. Left shoulder scapular dyskinesia    PLAN:  I reviewed that I would encourage continued PT. I showed patient her scapular dysfunction using a mirror in the PT department today and patient was able to see this. I advised patient that this should be improved/resolved with dedicated shoulder rehab program. If dedicated shoulder PT over next 6 weeks does not improve symptoms she should return to revisit next steps.     Gloria Fowler MD  "

## 2020-03-12 NOTE — LETTER
Mike Lawton     1995  Encounter date/time:  03/12/20       Report of Workability    Physician:  Gloria Fowler MD     Date of Injury:  12/9/19  Diagnosis:  Left shoulder injury     Limitations:  No repetitive shoulder height or overhead work.  No lift/push/pull away from body with left arm more than 10 pounds.        Return to work status: Return to work WITH limitations above.     Follow-Up:   Return to clinic in 4 weeks or prn.

## 2020-03-18 ENCOUNTER — THERAPY VISIT (OUTPATIENT)
Dept: PHYSICAL THERAPY | Facility: CLINIC | Age: 25
End: 2020-03-18
Payer: OTHER MISCELLANEOUS

## 2020-03-18 DIAGNOSIS — M25.512 SHOULDER PAIN, LEFT: ICD-10-CM

## 2020-03-18 PROCEDURE — 97112 NEUROMUSCULAR REEDUCATION: CPT | Mod: GP | Performed by: PHYSICAL THERAPIST

## 2020-03-18 PROCEDURE — 97110 THERAPEUTIC EXERCISES: CPT | Mod: GP | Performed by: PHYSICAL THERAPIST

## 2020-04-07 ENCOUNTER — VIRTUAL VISIT (OUTPATIENT)
Dept: PHYSICAL THERAPY | Facility: CLINIC | Age: 25
End: 2020-04-07
Payer: OTHER MISCELLANEOUS

## 2020-04-07 DIAGNOSIS — M25.512 SHOULDER PAIN, LEFT: ICD-10-CM

## 2020-04-07 PROCEDURE — 97110 THERAPEUTIC EXERCISES: CPT | Mod: 95 | Performed by: PHYSICAL THERAPIST

## 2020-04-07 NOTE — PROGRESS NOTES
"Physical Therapy Virtual Follow Up Visit      The patient has been notified of following:     \"This virtual visit will be conducted between you and your provider. We have found that certain health care needs can be provided without the need for physical presence.  This service lets us provide the care you need with a virtual visit.\"    Due to external, as well as internal [facility] management of the COVID-19 Virus, Mike Lawton was not seen in our clinic.  As a substitution, we implemented a virtual visit to manage this patient's condition utilizing the [employer] virtual visit platform via the patient s existing code.  The provider, Tawana Aguila, reviewed the patient's chart, PTRx prescription, and spoke with the patient to determine the following telemedicine visit is appropriate and effective for the patient's care.    The following type of visit was completed:   Video Visit:  The [employer] platform uses a synchronous HIPAA compliant video stream for this patient encounter.        S: Mike Lawton is a 24 year old female. Connected virtually on the [employer] platform to discuss their condition/progress. They noted improvements in Strength.  They noted ongoing pain or limitations with change of pain level.     Current pain level: 8/10  Patient is frustrated.  Has been able to complete some of her exercises but her pain is still present.  She will have sharp stabbing pain in the posterior shoulder specifically after moving or laying on the shoulder wrong.  She will be having a video call with the MD later this week.    O: Patient demonstrated   AROM  Flexion 155  Extension 50  Abduction 150  IR T8  ER 75    Cues needed to assist with mechanics to reduce shoulder blade winging and shoulder hiking throughout exercises.     PTRx Content from today's visit:  Exercise Name: Four Corner Stretch Flexion, Sets: 1 - Reps: 5-10x, Notes: 2-4x/day  Exercise Name: Bilateral Rotation With Theraband, Sets: 1 - Reps: 10x " progress to 20x Red TB  Exercise Name: Shoulder Theraband Internal Rotation, Sets: 1 - Reps: 10, Notes: RED TB  Exercise Name: Shoulder Theraband External Rotation, Sets: 1 - Reps: 10x Progress to 30x, Notes: Yellow TB 1x/day  Exercise Name: Shoulder Extension in Standing - Reps: 10x progress to 30x, Notes: 1x/day  Exercise Name: Bent Over Rows - Reps: 10  Exercise Name: Prone Scapula I - Reps: 10x 1#  Exercise Name: Prone Scapula T Thumbs Up - Reps: 10x , Notes: Focus on your arm coming straight up into a T versus dropping arms down  Exercise Name: Wall Press  Exercise Name: Ball Prone Scapula W, Notes: Focus on keeping elbows lower then your hands  Exercise Name: Wall Walk with Theraband - Reps: 3-5, Notes: Yellow band up/down wall  Exercise Name: Dynamic Hug with Band - Reps: 10x, Notes: Red band    A:   Patient is not progressing as expected.  Response to therapy has shown an improvement in  strength  Response to therapy has shown lack of progress in  pain level      P: Patient will continue with the exercise program assigned on their PTRx code and will add the following measures to manage their pain/condition:      Continue current treatment plan until patient demonstrates readiness to progress to higher level exercises.      Virtual visit contact time    Time of service began: 1230 PM  Time of service ended: 100 PM  Total Time for set up, visit, and documentation: 40 minutes    Payor: WORK COMP / Plan: CAS HUSSEIN SERV / Product Type: *No Product type* /   .  Procedure Code/s   Therapeutic Exercise (80229): 30 minutes    I have reviewed the note as documented above.  This accurately captures the substance of my conversation with the patient.  Provider location: [facility] ([facility]/State)  Patient location: Home    ___________________________________________________    Any subjective info about the quality of the visit, ease of use:   Patient's opinion about reasonable cost for this visit

## 2020-04-09 ENCOUNTER — VIRTUAL VISIT (OUTPATIENT)
Dept: ORTHOPEDICS | Facility: CLINIC | Age: 25
End: 2020-04-09
Payer: OTHER MISCELLANEOUS

## 2020-04-09 DIAGNOSIS — G25.89 SCAPULAR DYSKINESIS: Primary | ICD-10-CM

## 2020-04-09 PROCEDURE — 99213 OFFICE O/P EST LOW 20 MIN: CPT | Mod: GT | Performed by: ORTHOPAEDIC SURGERY

## 2020-04-09 NOTE — PATIENT INSTRUCTIONS
Thanks for coming today.  Ortho/Sports Medicine Clinic  [address] [place] [address]    To schedule future appointments in Ortho Clinic, you may call [phone].    To schedule ordered imaging by your provider:   Call Central Imaging [phone]    To schedule an injection ordered by your provider:  Call Central Imaging Injection scheduling line: [phone]  [employer] available online at:  [email or web address]    Please call if any further questions or concerns ([phone]).  Clinic hours 8 am to 5 pm.    Return to clinic (call) if symptoms worsen or fail to improve.

## 2020-04-09 NOTE — LETTER
Mike Lawton     1995  Encounter date/time:  04/09/20       Report of Workability  Physician:  Gloria Fowler MD     Date of Injury:  12/9/19  Diagnosis:  Left shoulder injury     Limitations:  No repetitive shoulder height or overhead work with more than 2 pounds.  No lift/push/pull away from body with left arm more than 10 pounds.        Return to work status: Return to work WITH limitations above.     Follow-Up:   Re-evaluate in 6 weeks depending on pandemic status  Continue PT.

## 2020-04-09 NOTE — PROGRESS NOTES
"Mike Lawton is a 24 year old female who is being evaluated via a billable video visit.      The patient has been notified of following:     \"This video visit will be conducted via a call between you and your physician/provider. We have found that certain health care needs can be provided without the need for an in-person physical exam.  This service lets us provide the care you need with a video conversation.  If a prescription is necessary we can send it directly to your pharmacy.  If lab work is needed we can place an order for that and you can then stop by our lab to have the test done at a later time.    Video visits are billed at different rates depending on your insurance coverage.  Please reach out to your insurance provider with any questions.    If during the course of the call the physician/provider feels a video visit is not appropriate, you will not be charged for this service.\"    Patient has given verbal consent for Video visit? Yes    How would you like to obtain your AVS? Blake    Patient would like the video invitation sent by: Text to cell phone: [phone]      Video Start Time: 1:49pm    Chief Complaint   Patient presents with     Follow Up     4 wk clinic follow up - work injury of left shoulder DOI: 12/9/2019       I have reviewed and updated the patient's Past Medical History, Social History, Family History and Medication List.    ALLERGIES  Penicillin g    Additional provider notes:   CHIEF CONCERN:  Left shoulder pain (work injury)    HISTORY OF PRESENT ILLNESS: Mike is a pleasant 24 year old right hand dominant female who is following up after starting physical therapy.  She has been doing weekly virtual physical therapy with Tawana. Her last note from earlier this week mentioned persistent winging. The patient notes that her shoulder has still been quite painful - she had to adjust her PT exercises which has been somewhat helpful.     PHYSICAL EXAM:    Adult female in no " acute distress. Articulates and communicates with normal affect.  Respirations appear even and unlabored via video  Focused upper extremity exam:  Left shoulder AROM is full. Difficult to evaluate her scapular motion via her video today.     ASSESSMENT:  1. Left shoulder pain, work injury  2. Left shoulder scapular dyskinesia    PLAN:  I recommend continued PT exercises with daily home rehab. Given current pandemic current in person evaluations and interventions are limited. If she does not see progress in next 4-6 weeks I discussed the possibility of obtaining EMG (which is not available at present given the covid pandemic). Her winging is not dissimilar to that seen at times with brachial neuritis but her injury and history are not altogether consistent with such a diagnosis. Will determine whether EMG is indicated at next follow up.       Video-Visit Details    Type of service:  Video Visit    Video End Time (time video stopped): 2:01    Originating Location (pt. Location): Home    Distant Location (provider location):  [facility]    Mode of Communication:  Video Conference via Quixey      Gloria Fowler MD

## 2020-04-10 ENCOUNTER — TELEPHONE (OUTPATIENT)
Dept: ORTHOPEDICS | Facility: CLINIC | Age: 25
End: 2020-04-10

## 2020-04-10 NOTE — TELEPHONE ENCOUNTER
1st Attempt LVM for patient to call back to schedule her 6 week follow up appointment (in clinic) with Dr Fowler. Mike is due to be seen on or around May 21. I asked her to please call [phone] to schedule.     Edgar Lopez  Procedure , [place]  Peds Specialty and Adult Endocrinology

## 2020-04-15 ENCOUNTER — TELEPHONE (OUTPATIENT)
Dept: ORTHOPEDICS | Facility: CLINIC | Age: 25
End: 2020-04-15

## 2020-04-15 ENCOUNTER — VIRTUAL VISIT (OUTPATIENT)
Dept: PHYSICAL THERAPY | Facility: CLINIC | Age: 25
End: 2020-04-15
Payer: OTHER MISCELLANEOUS

## 2020-04-15 DIAGNOSIS — M25.512 SHOULDER PAIN, LEFT: ICD-10-CM

## 2020-04-15 PROCEDURE — 97112 NEUROMUSCULAR REEDUCATION: CPT | Mod: 95 | Performed by: PHYSICAL THERAPIST

## 2020-04-15 PROCEDURE — 97110 THERAPEUTIC EXERCISES: CPT | Mod: 95 | Performed by: PHYSICAL THERAPIST

## 2020-04-15 NOTE — TELEPHONE ENCOUNTER
[facility] Call Center    Phone Message    May a detailed message be left on voicemail: yes     Reason for Call: Other: work comp called to get returned back to work date and appt notes this can be faxed to [phone] please advise     Action Taken: Message routed to:  Adult Clinics: Orthopedics p [id number]

## 2020-04-15 NOTE — PROGRESS NOTES
"Physical Therapy Virtual Follow Up Visit      The patient has been notified of following:     \"This virtual visit will be conducted between you and your provider. We have found that certain health care needs can be provided without the need for physical presence.  This service lets us provide the care you need with a virtual visit.\"    Due to external, as well as internal [facility] management of the COVID-19 Virus, Mike Lawton was not seen in our clinic.  As a substitution, we implemented a virtual visit to manage this patient's condition utilizing the [employer] virtual visit platform via the patient s existing code.  The provider, Tawana Aguila, reviewed the patient's chart, PTRx prescription, and spoke with the patient to determine the following telemedicine visit is appropriate and effective for the patient's care.    The following type of visit was completed:   Video Visit:  The PTRx platform uses a synchronous HIPAA compliant video stream for this patient encounter.        S: Mike Lawton is a 24 year old female. Connected virtually on the PTRx platform to discuss their condition/progress. They noted improvements in nothing.  They noted ongoing pain or limitations with certain movements.     Current pain level: 5-7/10  Patient reports she has been doing exercises daily to every other day.  The exercises has not caused increased pain but has not changed her current complaints.    O: Patient demonstrated    AROM  Flexion 155  Extension 50  Abduction 150  IR T8  ER 75     Cues needed to assist with mechanics to reduce shoulder blade winging and shoulder hiking throughout exercises.     PTRx Content from today's visit:  Exercise Name: Prone Scapula T Thumbs Up, Sets: GROUP 1 - Reps: 10x , Notes: Focus on your arm coming straight up into a T versus dropping arms down  Exercise Name: Ball Prone Scapula W, Sets: GROUP 1, Notes: Focus on keeping elbows lower then your hands  Exercise Name: Bilateral " "Rotation With Theraband, Sets: GROUP 1 - Reps: 10x progress to 20x Red TB  Exercise Name: Wall Walk with Theraband, Sets: GROUP 1 - Reps: 3-5, Notes: Yellow band up/down wall  Exercise Name: Dynamic Hug with Band, Sets: GROUP 1 - Reps: 10x, Notes: Red band  Exercise Name: Shoulder Extension in Standing, Sets: GROUP 2 - Reps: 10x progress to 30x, Notes: 1x/day  Exercise Name: Bent Over Rows, Sets: GROUP 2 - Reps: 10, Notes: Laying on bed with arm hanging off the side  Exercise Name: Prone Plank Modified Knees, Sets: 3 - Reps: 10\" , Notes: GROUP 2  Exercise Name: Elbow Strengthening Flexion with Theraband, Sets: 1 - Reps: 10, Notes: RED TB~ stress overhead  GROUP 2  Exercise Name: Shoulder Horizontal Abduction/Diagonals With Theraband - Reps: 10, Notes: RED TB  GROUP 2    A:   No change of symptoms has been noted.  Response to therapy has shown lack of progress in  pain level      P: Patient will continue with the exercise program assigned on their PTRx code and will add the following measures to manage their pain/condition:      Continue current treatment plan until patient demonstrates readiness to progress to higher level exercises.      Virtual visit contact time    Time of service began: 1:10 PM  Time of service ended: 1:35 PM  Total Time for set up, visit, and documentation: 35 minutes    Payor: WORK COMP / Plan: CAS HUSSEIN SERV / Product Type: *No Product type* /   .  Procedure Code/s   Therapeutic Exercise (93207): 10 minutes  Neuromuscular Re-education (64497): 15 minutes    I have reviewed the note as documented above.  This accurately captures the substance of my conversation with the patient.  Provider location: [place] ([facility]/State)  Patient location: Home              "

## 2020-04-20 ENCOUNTER — E-VISIT (OUTPATIENT)
Dept: PEDIATRICS | Facility: CLINIC | Age: 25
End: 2020-04-20
Payer: COMMERCIAL

## 2020-04-20 DIAGNOSIS — J02.9 SORE THROAT: Primary | ICD-10-CM

## 2020-04-20 PROCEDURE — 99207 ZZC NON-BILLABLE SERV PER CHARTING: CPT | Performed by: FAMILY MEDICINE

## 2020-04-29 ENCOUNTER — VIRTUAL VISIT (OUTPATIENT)
Dept: PHYSICAL THERAPY | Facility: CLINIC | Age: 25
End: 2020-04-29
Payer: OTHER MISCELLANEOUS

## 2020-04-29 ENCOUNTER — TELEPHONE (OUTPATIENT)
Dept: PHYSICAL THERAPY | Facility: CLINIC | Age: 25
End: 2020-04-29

## 2020-04-29 DIAGNOSIS — M25.512 SHOULDER PAIN, LEFT: ICD-10-CM

## 2020-04-29 PROCEDURE — 97110 THERAPEUTIC EXERCISES: CPT | Mod: 95 | Performed by: PHYSICAL THERAPIST

## 2020-04-29 PROCEDURE — 97112 NEUROMUSCULAR REEDUCATION: CPT | Mod: 95 | Performed by: PHYSICAL THERAPIST

## 2020-04-29 NOTE — PROGRESS NOTES
"Physical Therapy Virtual Follow Up Visit      The patient has been notified of following:     \"This virtual visit will be conducted between you and your provider. We have found that certain health care needs can be provided without the need for physical presence.  This service lets us provide the care you need with a virtual visit.\"    Due to external, as well as internal [facility] management of the COVID-19 Virus, Mike Lawton was not seen in our clinic.  As a substitution, we implemented a virtual visit to manage this patient's condition utilizing the PTRx virtual visit platform via the patient s existing code.  The provider, Tawana Aguila, reviewed the patient's chart, PTRx prescription, and spoke with the patient to determine the following telemedicine visit is appropriate and effective for the patient's care.    The following type of visit was completed:   Video Visit:  The PTRx platform uses a synchronous HIPAA compliant video stream for this patient encounter.        S: Mike Lawton is a 24 year old female. Connected virtually on the PTRx platform to discuss their condition/progress. They noted improvements in Strength.  They noted ongoing pain or limitations with Pain.     Current pain level: 7/10  Patient reports she has been completing her exercises about every other day.  Still is having sharp pain which come for unknown reasons.  There can be a pinch with exercises into the shoulder blade and anterior pain in the shoulder is overhead motions.    O: Patient demonstrated   AROM  Flexion 155  Extension 50  Abduction 150++  IR T8 +  ER 75     Cues needed to not overextend into motions which causes pinching sensation.  GOAL is to facilitate the muscle and then hold the position for a few seconds to fatigue the muscle without irritation surrounding tissue/joint    PTRx Content from today's visit:  Exercise Name: Prone Scapula T Thumbs Up, Sets: GROUP 1 - Reps: 10x , Notes: Focus on your " arm coming straight up into a T versus dropping arms down  Exercise Name: Ball Prone Scapula W, Sets: GROUP 1, Notes: Focus on keeping elbows lower then your hands  Exercise Name: Bilateral Rotation With Theraband, Sets: GROUP 1 - Reps: 10x progress to 20x Red TB  Exercise Name: Wall Walk with Theraband, Sets: GROUP 1 - Reps: 3-5, Notes: Yellow band up/down wall  Exercise Name: Dynamic Hug with Band, Sets: GROUP 1 - Reps: 10x, Notes: Red band  Exercise Name: Shoulder Extension in Standing, Sets: GROUP 2 - Reps: 10x progress to 30x, Notes: 1x/day  Exercise Name: Bent Over Rows, Sets: GROUP 2 - Reps: 10, Notes: Laying on bed with arm hanging off the side  Exercise Name: Elbow Strengthening Flexion with Theraband, Sets: 1 - Reps: 10, Notes: RED TB~ stress overhead  GROUP 2  Exercise Name: Shoulder Horizontal Abduction/Diagonals With Theraband - Reps: 10, Notes: RED TB  GROUP 2  Exercise Name: Shoulder Theraband Rows, Sets: 1 - Reps: 10 progress to 20 - Sessions: 1, Notes: Red TB  Exercise Name: Wall Push Up Plus, Sets: 10x - Reps: Progress to 30x, Notes: Finger tips below shoulder height; pushing back through the palms    Exercise Name: Posterior capsule stretch Sets: 1 - Reps: 10x    A:   Patient is progressing as expected.  Response to therapy has shown an improvement in  strength  Response to therapy has shown lack of progress in  pain level      P: Patient will continue with the exercise program assigned on their PTRx code and will add the following measures to manage their pain/condition:      Current treatment program is being advanced to more complex exercises.      Virtual visit contact time    Time of service began: 2:30 PM  Time of service ended: 3:08 PM  Total Time for set up, visit, and documentation: 38 minutes    Payor: WORK COMP / Plan: CAS HUSSEIN SERV / Product Type: *No Product type* /   .  Procedure Code/s   Therapeutic Exercise (61838): 28 minutes  Neuromuscular Re-education  (78502): 10 minutes    I have reviewed the note as documented above.  This accurately captures the substance of my conversation with the patient.  Provider location: [place] ([facility])  Patient location: Home

## 2020-05-06 ENCOUNTER — VIRTUAL VISIT (OUTPATIENT)
Dept: PHYSICAL THERAPY | Facility: CLINIC | Age: 25
End: 2020-05-06
Payer: OTHER MISCELLANEOUS

## 2020-05-06 DIAGNOSIS — M25.512 SHOULDER PAIN, LEFT: ICD-10-CM

## 2020-05-06 PROCEDURE — 97110 THERAPEUTIC EXERCISES: CPT | Mod: 95 | Performed by: PHYSICAL THERAPIST

## 2020-05-06 NOTE — PROGRESS NOTES
"Physical Therapy Virtual Follow Up Visit      The patient has been notified of following:     \"This virtual visit will be conducted between you and your provider. We have found that certain health care needs can be provided without the need for physical presence.  This service lets us provide the care you need with a virtual visit.\"    Due to external, as well as internal [facility] management of the COVID-19 Virus, Mike Lawton was not seen in our clinic.  As a substitution, we implemented a virtual visit to manage this patient's condition utilizing the PTRx virtual visit platform via the patient s existing code.  The provider, Tawana Aguila, reviewed the patient's chart, PTRx prescription, and spoke with the patient to determine the following telemedicine visit is appropriate and effective for the patient's care.    The following type of visit was completed:   Video Visit:  The PTRx platform uses a synchronous HIPAA compliant video stream for this patient encounter.        S: Mike Lawton is a 24 year old female. Connected virtually on the PTRx platform to discuss their condition/progress. They noted improvements in strength.  They noted ongoing pain or limitations with pain and function.     Current pain level: 7/10  Patient reports overall no change in pain.  There is a burning/bruised sensation that is present constantly.  This increased yesterday while pouring herself a glass of milk.  Patient continues to complete strength daily though is has not changed her baseline pain.    O: Patient demonstrated     AROM  Flexion 155  Abduction 155 pain present 90 deg to ER   IR T8+   ER 75     Cues needed to assist with correct sholder mechanics specifically with HZ abd, rows and overhead thera band exercises.    PTRx Content from today's visit:  Exercise Name: Prone Scapula T Thumbs Up, Sets: GROUP 1 - Reps: 10x , Notes: Focus on your arm coming straight up into a T versus dropping arms " down  Exercise Name: Ball Prone Scapula W, Sets: GROUP 1, Notes: Focus on keeping elbows lower then your hands  Exercise Name: Bilateral Rotation With Theraband, Sets: GROUP 1 - Reps: 10x progress to 20x Red TB  Exercise Name: Wall Walk with Theraband, Sets: GROUP 1 - Reps: 3-5, Notes: Yellow band up/down wall  Exercise Name: Dynamic Hug with Band, Sets: GROUP 1 - Reps: 10x, Notes: Red band  Exercise Name: Shoulder Extension in Standing, Sets: GROUP 2 - Reps: 10x progress to 30x, Notes: 1x/day  Exercise Name: Bent Over Rows, Sets: GROUP 2 - Reps: 10, Notes: Laying on bed with arm hanging off the side  Exercise Name: Elbow Strengthening Flexion with Theraband, Sets: 1 - Reps: 10, Notes: RED TB~ stress overhead  GROUP 2  Exercise Name: Shoulder Horizontal Abduction/Diagonals With Theraband - Reps: 10, Notes: RED TB  GROUP 2  Exercise Name: Shoulder Theraband Rows, Sets: 1 - Reps: 10 progress to 20 - Sessions: 1, Notes: Red TB  Exercise Name: Wall Push Up Plus, Sets: 10x - Reps: Progress to 30x, Notes: Finger tips below shoulder height; pushing back through the palms    A:   Patient is not progressing as expected.  Response to therapy has shown an improvement in  strength  Response to therapy has shown lack of progress in  pain level      P: Patient will continue with the exercise program assigned on their PTRx code and will add the following measures to manage their pain/condition:      Continue current treatment plan until patient demonstrates readiness to progress to higher level exercises.      Virtual visit contact time    Time of service began: 3:10 PM  Time of service ended: 3:25 PM  Total Time for set up, visit, and documentation: 40 minutes    Payor: WORK COMP / Plan: CAS HUSSEIN SERV / Product Type: *No Product type* /   .  Procedure Code/s   Therapeutic Exercise (74049): 25 minutes    I have reviewed the note as documented above.  This accurately captures the substance of my conversation  with the patient.  Provider location: [place] ([facility]/State)  Patient location: Home

## 2020-05-13 ENCOUNTER — VIRTUAL VISIT (OUTPATIENT)
Dept: PHYSICAL THERAPY | Facility: CLINIC | Age: 25
End: 2020-05-13
Payer: OTHER MISCELLANEOUS

## 2020-05-13 DIAGNOSIS — M25.512 SHOULDER PAIN, LEFT: ICD-10-CM

## 2020-05-13 PROCEDURE — 97110 THERAPEUTIC EXERCISES: CPT | Mod: 95 | Performed by: PHYSICAL THERAPIST

## 2020-05-13 NOTE — PROGRESS NOTES
"Physical Therapy Virtual Follow Up Visit      The patient has been notified of following:     \"This virtual visit will be conducted between you and your provider. We have found that certain health care needs can be provided without the need for physical presence.  This service lets us provide the care you need with a virtual visit.\"    Due to external, as well as internal [facility] management of the COVID-19 Virus, Mike Lawton was not seen in our clinic.  As a substitution, we implemented a virtual visit to manage this patient's condition utilizing the PTRx virtual visit platform via the patient s existing code.  The provider, Tawana Aguila, reviewed the patient's chart, PTRx prescription, and spoke with the patient to determine the following telemedicine visit is appropriate and effective for the patient's care.    The following type of visit was completed:   Video Visit:  The PTRx platform uses a synchronous HIPAA compliant video stream for this patient encounter.        S: Mike Lawton is a 24 year old female. Connected virtually on the PTRx platform to discuss their condition/progress. They noted improvements in pain.  They noted ongoing pain or limitations with function.     Current pain level: 5/10  Patient reports there has been less intense pain this week-more of a bruise sensation that comes and goes.  Cannot define a specific activity that irritates the shoulder    O: Patient demonstrated Full AROM pain free except IR pain at end-range and slight loss of ROM in Abduction due to pain     PTRx Content from today's visit:  Exercise Name: Prone Scapula T Thumbs Up, Sets: GROUP 1 - Reps: 10x , Notes: Focus on your arm coming straight up into a T versus dropping arms down  Exercise Name: Ball Prone Scapula W, Sets: GROUP 1, Notes: Focus on keeping elbows lower then your hands  Exercise Name: Wall Walk with Theraband, Sets: GROUP 1 - Reps: 3-5, Notes: Yellow band up/down wall  Exercise " Name: Dynamic Hug with Band, Sets: GROUP 1 - Reps: 10x, Notes: Red band  Exercise Name: Shoulder Extension in Standing, Sets: GROUP 2 - Reps: 10x progress to 30x 2#, Notes: 1x/day  Exercise Name: Bent Over Rows, Sets: GROUP 2 - Reps: 2# 10, Notes: Laying on bed with arm hanging off the side  Exercise Name: Elbow Strengthening Flexion with Theraband, Sets: 1 - Reps: 10, Notes: RED TB~ stress overhead  GROUP 2  Exercise Name: Shoulder Horizontal Abduction/Diagonals With Theraband - Reps: 10, Notes: GREEN TB  GROUP 2  Exercise Name: Shoulder Theraband Rows, Sets: 1 - Reps: 10 progress to 20 - Sessions: 1, Notes: GreenTB    A:   Patient's symptoms are resolving slowly though there is still increased pain.  Response to therapy has shown an improvement in  strength  Response to therapy has shown lack of progress in  pain level      P: Patient will continue with the exercise program assigned on their PTRx code and will add the following measures to manage their pain/condition:      Continue current treatment plan until patient demonstrates readiness to progress to higher level exercises.      Virtual visit contact time    Time of service began: 2:30 PM  Time of service ended: 2:55 PM  Total Time for set up, visit, and documentation: 30 minutes    Payor: WORK COMP / Plan: CAS HUSSEIN SERV / Product Type: *No Product type* /   .  Procedure Code/s   Therapeutic Exercise (31911): 25 minutes    I have reviewed the note as documented above.  This accurately captures the substance of my conversation with the patient.  Provider location: [facility] ([facility])  Patient location: Home

## 2020-05-20 NOTE — PROGRESS NOTES
"Mike Lawton is a 24 year old female who is being evaluated via a billable video visit.      The patient has been notified of following:     \"This video visit will be conducted via a call between you and your physician/provider. We have found that certain health care needs can be provided without the need for an in-person physical exam.  This service lets us provide the care you need with a video conversation.  If a prescription is necessary we can send it directly to your pharmacy.  If lab work is needed we can place an order for that and you can then stop by our lab to have the test done at a later time.    Video visits are billed at different rates depending on your insurance coverage.  Please reach out to your insurance provider with any questions.    If during the course of the call the physician/provider feels a video visit is not appropriate, you will not be charged for this service.\"    Patient has given verbal consent for Video visit? Yes    How would you like to obtain your AVS? Blake    Patient would like the video invitation sent by: Text to cell phone: [phone]    Will anyone else be joining your video visit? No      CHIEF CONCERN:  Left shoulder pain (work injury)    HISTORY OF PRESENT ILLNESS: Mike is a pleasant 24 year old right hand dominant female who is following up after starting physical therapy.  She has been doing virtual physical therapy with Tawana. She feels her pain is improving. Shoulder function improved.     PHYSICAL EXAM:    Adult female in no acute distress. Articulates and communicates with normal affect.  Respirations appear even and unlabored via video  Focused upper extremity exam:  Left shoulder AROM is full.  Her scapular motion via her video today appears much better with improved mechanics.     ASSESSMENT:  1. Left shoulder pain, work injury  2. Left shoulder scapular dyskinesia    PLAN:  I recommend continued PT exercises and encouraged importance of daily " home rehab. Encourage activity as tolerated. Will restrict heavier overhead activities for a time but monitor for expected progress with PT.  Return as needed in 6-8 weeks.      Video-Visit Details    Type of service:  Video Visit    Video Start Time: 1:58 PM  Video End Time: 2:12 PM    Originating Location (pt. Location): Home    Distant Location (provider location):  [facility]     Platform used for Video Visit: Dmitriy Fowler MD

## 2020-05-21 ENCOUNTER — VIRTUAL VISIT (OUTPATIENT)
Dept: ORTHOPEDICS | Facility: CLINIC | Age: 25
End: 2020-05-21
Payer: OTHER MISCELLANEOUS

## 2020-05-21 DIAGNOSIS — G25.89 SCAPULAR DYSKINESIS: Primary | ICD-10-CM

## 2020-05-21 PROCEDURE — 99213 OFFICE O/P EST LOW 20 MIN: CPT | Mod: GT | Performed by: ORTHOPAEDIC SURGERY

## 2020-05-21 NOTE — PATIENT INSTRUCTIONS
Thanks for coming today.  Ortho/Sports Medicine Clinic  [address] [place] [address]    To schedule future appointments in Ortho Clinic, you may call [phone].    To schedule ordered imaging by your provider:   Call Central Imaging [phone]    To schedule an injection ordered by your provider:  Call Central Imaging Injection scheduling line: [phone]  [facility] available online at:  [email or web address]    Please call if any further questions or concerns ([phone]).  Clinic hours 8 am to 5 pm.    Return to clinic (call) if symptoms worsen or fail to improve.

## 2020-05-27 ENCOUNTER — VIRTUAL VISIT (OUTPATIENT)
Dept: PHYSICAL THERAPY | Facility: CLINIC | Age: 25
End: 2020-05-27
Payer: OTHER MISCELLANEOUS

## 2020-05-27 DIAGNOSIS — M25.512 SHOULDER PAIN, LEFT: ICD-10-CM

## 2020-05-27 PROCEDURE — 97110 THERAPEUTIC EXERCISES: CPT | Mod: 95 | Performed by: PHYSICAL THERAPIST

## 2020-05-27 PROCEDURE — 97112 NEUROMUSCULAR REEDUCATION: CPT | Mod: 95 | Performed by: PHYSICAL THERAPIST

## 2020-05-27 NOTE — PROGRESS NOTES
"Physical Therapy Virtual Follow Up Visit      The patient has been notified of following:     \"This virtual visit will be conducted between you and your provider. We have found that certain health care needs can be provided without the need for physical presence.  This service lets us provide the care you need with a virtual visit.\"    Due to external, as well as internal [facility] management of the COVID-19 Virus, Mike Lawton was not seen in our clinic.  As a substitution, we implemented a virtual visit to manage this patient's condition utilizing the Imagiin.x virtual visit platform via the patient s existing code.  The provider, Tawana Aguila, reviewed the patient's chart, PTRx prescription, and spoke with the patient to determine the following telemedicine visit is appropriate and effective for the patient's care.    The following type of visit was completed:   Video Visit:  The Imagiin[employer]x platform uses a synchronous HIPAA compliant video stream for this patient encounter.        S: Mike Lawton is a 24 year old female. Connected virtually on the PTRx platform to discuss their condition/progress. They noted improvements in nothing.  They noted ongoing pain or limitations with pain.     Current pain level: 7/10  Patient reports she followed up with MD and restrictions were lifted to just avoid heavy overhead lifting. Patient is concerned with this-we discussed to follow back with MD regarding restrictions.  She has also noticed random bruising over the front of her shoulder which is painful.  Has no idea how this happened as she has been doing the normal stretches/strengthening.    O: Patient demonstrated Full AROM pain free except IR pain at end-range.     PTRx Content from today's visit:  Exercise Name: Prone Scapula T Thumbs Up, Sets: GROUP 1 - Reps: 10x , Notes: Focus on your arm coming straight up into a T versus dropping arms down  Exercise Name: Ball Prone Scapula W, Sets: GROUP 1, Notes: " Focus on keeping elbows lower then your hands  Exercise Name: Wall Walk with Theraband, Sets: GROUP 1 - Reps: 3-5, Notes: Yellow band up/down wall  Exercise Name: Dynamic Hug with Band, Sets: GROUP 1 - Reps: 10x, Notes: Red band  Exercise Name: Shoulder Extension in Standing, Sets: GROUP 2 - Reps: 10x progress to 30x 5#, Notes: 1x/day  Exercise Name: Bent Over Rows, Sets: GROUP 2 - Reps: 5# 10, Notes: Laying on bed with arm hanging off the side  Exercise Name: Elbow Strengthening Flexion with Theraband, Sets: 1 - Reps: 10, Notes: RED TB~ stress overhead  GROUP 2  Exercise Name: Shoulder Horizontal Abduction/Diagonals With Theraband - Reps: 10, Notes: GREEN TB  GROUP 2  Exercise Name: Shoulder Theraband Rows, Sets: 1 - Reps: 10 progress to 20 - Sessions: 1, Notes: GreenTB  Exercise Name: Shoulder External Rotation Sidelying, Sets: 1 - Reps: 10 progress to 20 5#; 2# - Sessions: 1  Exercise Name: Shoulder Flexion, Sets: 2 - Reps: 10-20 - Sessions: 1, Notes: 5#    A:   Patient is ready to progress to next level of strengthl.  Response to therapy has shown an improvement in  strength  Response to therapy has shown lack of progress in  pain level      P: Patient will continue with the exercise program assigned on their PTRx code and will add the following measures to manage their pain/condition:      Current treatment program is being advanced to more complex exercises.      Virtual visit contact time    Time of service began: 2:30 PM  Time of service ended: 3:05 PM  Total Time for set up, visit, and documentation: 45 minutes    Payor: WORK COMP / Plan: CAS HUSSEIN SERV / Product Type: *No Product type* /   .  Procedure Code/s   Therapeutic Exercise (21411): 25 minutes  Neuromuscular Re-education (19722): 10 minutes    I have reviewed the note as documented above.  This accurately captures the substance of my conversation with the patient.  Provider location: [place] ([facility]State)  Patient location:  Home

## 2020-05-28 ENCOUNTER — TELEPHONE (OUTPATIENT)
Dept: ORTHOPEDICS | Facility: CLINIC | Age: 25
End: 2020-05-28

## 2020-05-28 NOTE — TELEPHONE ENCOUNTER
6/11 2nd attempt  Provided phone number [phone] to schedule  in about 6 weeks (around 7/2/2020).    Mandi Jones   Procedure    Ortho/Sports Med/Ent/Eye   [facility]   [phone]        5/28 Provided phone number [phone] to schedule  in about 6 weeks (around 7/2/2020).    Mandi Jones   Procedure    Ortho/Sports Med/Ent/Eye   [facility]   [phone]

## 2020-06-18 NOTE — TELEPHONE ENCOUNTER
6/18 3rd attempt  Provided phone number [phone] to schedule  in about 6 weeks (around 7/2/2020).    Mandi Jones   Procedure    Ortho/Sports Med/Ent/Eye   [facility]   [phone]

## 2020-10-12 PROBLEM — M25.512 SHOULDER PAIN, LEFT: Status: RESOLVED | Noted: 2020-01-07 | Resolved: 2020-10-12

## 2020-10-12 NOTE — PROGRESS NOTES
Discharge Note    Progress reporting period is from last progress note on 03/03/20 to May 27, 2020.    Mike failed to follow up and current status is unknown.  Please see information below for last relevant information on current status.  Patient seen for 13 visits.    SUBJECTIVE  Subjective changes noted by patient:     .  Current pain level is 7/10.     Previous pain level was  7/10.   Changes in function:  Yes (See Goal flowsheet attached for changes in current functional level)  Adverse reaction to treatment or activity: None    OBJECTIVE  Changes noted in objective findings:       ASSESSMENT/PLAN  Diagnosis: L Shoulder   Updated problem list and treatment plan:     STG/LTGs have been met or progress has been made towards goals:  Yes, please see goal flowsheet for most current information  Assessment of Progress: current status is unknown.    Last current status:     Self Management Plans:  HEP  I have re-evaluated this patient and find that the nature, scope, duration and intensity of the therapy is appropriate for the medical condition of the patient.  Mike continues to require the following intervention to meet STG and LTG's:  HEP.    Recommendations:  Discharge with current home program.  Patient to follow up with MD as needed.    Please refer to the daily flowsheet for treatment today, total treatment time and time spent performing 1:1 timed codes.

## 2020-11-30 ENCOUNTER — ANCILLARY PROCEDURE (OUTPATIENT)
Dept: GENERAL RADIOLOGY | Facility: CLINIC | Age: 25
End: 2020-11-30
Attending: NURSE PRACTITIONER
Payer: COMMERCIAL

## 2020-11-30 ENCOUNTER — OFFICE VISIT (OUTPATIENT)
Dept: FAMILY MEDICINE | Facility: CLINIC | Age: 25
End: 2020-11-30
Payer: COMMERCIAL

## 2020-11-30 VITALS
WEIGHT: 162.6 LBS | DIASTOLIC BLOOD PRESSURE: 77 MMHG | OXYGEN SATURATION: 97 % | HEIGHT: 64 IN | BODY MASS INDEX: 27.76 KG/M2 | TEMPERATURE: 98.8 F | HEART RATE: 75 BPM | SYSTOLIC BLOOD PRESSURE: 118 MMHG

## 2020-11-30 DIAGNOSIS — R10.13 ABDOMINAL PAIN, EPIGASTRIC: Primary | ICD-10-CM

## 2020-11-30 DIAGNOSIS — K59.01 SLOW TRANSIT CONSTIPATION: ICD-10-CM

## 2020-11-30 DIAGNOSIS — R10.13 ABDOMINAL PAIN, EPIGASTRIC: ICD-10-CM

## 2020-11-30 LAB
ALBUMIN SERPL-MCNC: 4.3 G/DL (ref 3.4–5)
ALP SERPL-CCNC: 65 U/L (ref 40–150)
ALT SERPL W P-5'-P-CCNC: 22 U/L (ref 0–50)
ANION GAP SERPL CALCULATED.3IONS-SCNC: 4 MMOL/L (ref 3–14)
AST SERPL W P-5'-P-CCNC: 15 U/L (ref 0–45)
BASOPHILS # BLD AUTO: 0 10E9/L (ref 0–0.2)
BASOPHILS NFR BLD AUTO: 0.1 %
BILIRUB SERPL-MCNC: 0.6 MG/DL (ref 0.2–1.3)
BUN SERPL-MCNC: 10 MG/DL (ref 7–30)
CALCIUM SERPL-MCNC: 9.4 MG/DL (ref 8.5–10.1)
CHLORIDE SERPL-SCNC: 104 MMOL/L (ref 94–109)
CO2 SERPL-SCNC: 29 MMOL/L (ref 20–32)
CREAT SERPL-MCNC: 0.82 MG/DL (ref 0.52–1.04)
DIFFERENTIAL METHOD BLD: NORMAL
EOSINOPHIL # BLD AUTO: 0 10E9/L (ref 0–0.7)
EOSINOPHIL NFR BLD AUTO: 0.3 %
ERYTHROCYTE [DISTWIDTH] IN BLOOD BY AUTOMATED COUNT: 13 % (ref 10–15)
GFR SERPL CREATININE-BSD FRML MDRD: >90 ML/MIN/{1.73_M2}
GLUCOSE SERPL-MCNC: 78 MG/DL (ref 70–99)
HCG UR QL: NEGATIVE
HCT VFR BLD AUTO: 43.6 % (ref 35–47)
HGB BLD-MCNC: 14 G/DL (ref 11.7–15.7)
LYMPHOCYTES # BLD AUTO: 1.5 10E9/L (ref 0.8–5.3)
LYMPHOCYTES NFR BLD AUTO: 14.8 %
MCH RBC QN AUTO: 27 PG (ref 26.5–33)
MCHC RBC AUTO-ENTMCNC: 32.1 G/DL (ref 31.5–36.5)
MCV RBC AUTO: 84 FL (ref 78–100)
MONOCYTES # BLD AUTO: 0.5 10E9/L (ref 0–1.3)
MONOCYTES NFR BLD AUTO: 4.7 %
NEUTROPHILS # BLD AUTO: 8 10E9/L (ref 1.6–8.3)
NEUTROPHILS NFR BLD AUTO: 80.1 %
PLATELET # BLD AUTO: 363 10E9/L (ref 150–450)
POTASSIUM SERPL-SCNC: 4.1 MMOL/L (ref 3.4–5.3)
PROT SERPL-MCNC: 7.7 G/DL (ref 6.8–8.8)
RBC # BLD AUTO: 5.18 10E12/L (ref 3.8–5.2)
SODIUM SERPL-SCNC: 137 MMOL/L (ref 133–144)
TSH SERPL DL<=0.005 MIU/L-ACNC: 0.53 MU/L (ref 0.4–4)
WBC # BLD AUTO: 10 10E9/L (ref 4–11)

## 2020-11-30 PROCEDURE — 80050 GENERAL HEALTH PANEL: CPT | Performed by: NURSE PRACTITIONER

## 2020-11-30 PROCEDURE — 74019 RADEX ABDOMEN 2 VIEWS: CPT | Performed by: RADIOLOGY

## 2020-11-30 PROCEDURE — 81025 URINE PREGNANCY TEST: CPT | Performed by: NURSE PRACTITIONER

## 2020-11-30 PROCEDURE — 36415 COLL VENOUS BLD VENIPUNCTURE: CPT | Performed by: NURSE PRACTITIONER

## 2020-11-30 PROCEDURE — 83516 IMMUNOASSAY NONANTIBODY: CPT | Mod: 59 | Performed by: NURSE PRACTITIONER

## 2020-11-30 PROCEDURE — 83516 IMMUNOASSAY NONANTIBODY: CPT | Performed by: NURSE PRACTITIONER

## 2020-11-30 PROCEDURE — 99214 OFFICE O/P EST MOD 30 MIN: CPT | Performed by: NURSE PRACTITIONER

## 2020-11-30 RX ORDER — DOCUSATE SODIUM 100 MG/1
100 CAPSULE, LIQUID FILLED ORAL 2 TIMES DAILY
Qty: 60 CAPSULE | Refills: 1 | Status: SHIPPED | OUTPATIENT
Start: 2020-11-30

## 2020-11-30 RX ORDER — POLYETHYLENE GLYCOL 3350 17 G/17G
1 POWDER, FOR SOLUTION ORAL DAILY
Qty: 255 G | Refills: 1 | Status: SHIPPED | OUTPATIENT
Start: 2020-11-30

## 2020-11-30 ASSESSMENT — MIFFLIN-ST. JEOR: SCORE: 1477.55

## 2020-11-30 ASSESSMENT — PAIN SCALES - GENERAL: PAINLEVEL: NO PAIN (0)

## 2020-11-30 NOTE — PROGRESS NOTES
"Subjective     Mike Lawton is a 25 year old female who presents to clinic today for the following health issues:    HPI         Abdominal/Flank Pain  Onset/Duration: 2 months ago   Description:   Character: unkown   Location: low-umbilical region  Radiation: None  Intensity: 0/10  Progression of Symptoms:  worsening and intermittent  Accompanying Signs & Symptoms:  Fever/Chills: no  Gas/Bloating: YES  Nausea: YES  Vomitting: YES  Diarrhea: no  Constipation: YES, occ.    Dysuria or Hematuria: no  History:   Trauma: no  Previous similar pain: no  Previous tests done: none  Precipitating factors:   Does the pain change with:     Food: no    Bowel Movement: no    Urination: no   Other factors:  Laying down makes it feel better   Therapies tried and outcome: None  No LMP recorded. (Menstrual status: IUD). Mirena- placed 1-2 years ago.    Wakes up nauseated, then vomits and then feels better.  Went on for 4 days in a row last week.  Does not always vomit but usually does wake up at least with abdominal pain.    Denies night sweats, weight loss, abnormal fatigue.  Some constipation.  Last month went five days without bowel movement.          Review of Systems   Constitutional, HEENT, cardiovascular, pulmonary, GI, , musculoskeletal, neuro, skin, endocrine and psych systems are negative, except as otherwise noted.      Objective    /77 (BP Location: Left arm, Patient Position: Chair, Cuff Size: Adult Regular)   Pulse 75   Temp 98.8  F (37.1  C) (Tympanic)   Ht 1.626 m (5' 4\")   Wt 73.8 kg (162 lb 9.6 oz)   SpO2 97%   Breastfeeding No   BMI 27.91 kg/m    Body mass index is 27.91 kg/m .  Physical Exam   GENERAL: healthy, alert and no distress  NECK: no adenopathy, no asymmetry, masses, or scars and thyroid normal to palpation  RESP: lungs clear to auscultation - no rales, rhonchi or wheezes  CV: regular rate and rhythm, normal S1 S2, no S3 or S4, no murmur, click or rub, no peripheral edema and " peripheral pulses strong  ABDOMEN: tenderness epigastric and umbilical and bowel sounds normal  MS: no gross musculoskeletal defects noted, no edema  SKIN: no suspicious lesions or rashes  PSYCH: mentation appears normal, affect normal/bright    Results for orders placed or performed in visit on 11/30/20 (from the past 24 hour(s))   CBC with platelets and differential   Result Value Ref Range    WBC 10.0 4.0 - 11.0 10e9/L    RBC Count 5.18 3.8 - 5.2 10e12/L    Hemoglobin 14.0 11.7 - 15.7 g/dL    Hematocrit 43.6 35.0 - 47.0 %    MCV 84 78 - 100 fl    MCH 27.0 26.5 - 33.0 pg    MCHC 32.1 31.5 - 36.5 g/dL    RDW 13.0 10.0 - 15.0 %    Platelet Count 363 150 - 450 10e9/L    % Neutrophils 80.1 %    % Lymphocytes 14.8 %    % Monocytes 4.7 %    % Eosinophils 0.3 %    % Basophils 0.1 %    Absolute Neutrophil 8.0 1.6 - 8.3 10e9/L    Absolute Lymphocytes 1.5 0.8 - 5.3 10e9/L    Absolute Monocytes 0.5 0.0 - 1.3 10e9/L    Absolute Eosinophils 0.0 0.0 - 0.7 10e9/L    Absolute Basophils 0.0 0.0 - 0.2 10e9/L    Diff Method Automated Method    HCG Qual, Urine ([id number])   Result Value Ref Range    HCG Qual Urine Negative NEG^Negative         ABDOMEN TWO-THREE VIEW  11/30/2020 3:15 PM      HISTORY: Abdominal pain, epigastric.     COMPARISON: None.     FINDINGS: Moderate  amount of stool. No free air. There are no air  filled distended loops of small bowel. The colon is not distended. The  lung bases are unremarkable.                                                                      IMPRESSION: Nonobstructed bowel gas pattern.     ANTWAN HUTCHINS MD  Assessment & Plan     Abdominal pain, epigastric  Will treat as constipation until remainder of labs come back.  Mom does have thyroid disease (Hashimotos) so will check celiac panel.  Follow-up if symptoms persist despite treatment.   - XR Abdomen 2 Views; Future  - CBC with platelets and differential  - Comprehensive metabolic panel (BMP + Alb, Alk Phos, ALT, AST, Total. Bili, TP)  -  "HCG Qual, Urine ([id number])  - polyethylene glycol (MIRALAX) 17 GM/Dose powder; Take 17 g (1 capful) by mouth daily  - docusate sodium (COLACE) 100 MG capsule; Take 1 capsule (100 mg) by mouth 2 times daily  - Tissue transglutaminase abelardo IgA and IgG  - *UA reflex to Microscopic and Culture ([facility] (except [facility] [place]); Future    Slow transit constipation  - TSH with free T4 reflex  - polyethylene glycol (MIRALAX) 17 GM/Dose powder; Take 17 g (1 capful) by mouth daily  - docusate sodium (COLACE) 100 MG capsule; Take 1 capsule (100 mg) by mouth 2 times daily  - Tissue transglutaminase abelardo IgA and IgG     BMI:   Estimated body mass index is 27.91 kg/m  as calculated from the following:    Height as of this encounter: 1.626 m (5' 4\").    Weight as of this encounter: 73.8 kg (162 lb 9.6 oz).   Weight management plan: Discussed healthy diet and exercise guidelines         Patient Instructions   Xray does have significant amount of stool.  Recommend Miralax daily and Colace twice a day for 1-2 weeks and then as needed to have a good, soft bowel movement 1-2 times daily.  Will follow up when labs come in and if symptoms persist once constipation is resolved.    Patient Education     Constipation (Adult)  Constipation means that you have bowel movements that are less frequent than usual. Stools often become very hard and difficult to pass.  Constipation is very common. At some point in life, it affects almost everyone. Since everyone's bowel habits are different, what is constipation to one person may not be to another. Your healthcare provider may do tests to diagnose constipation. It depends on what he or she finds when evaluating you.    Symptoms of constipation include:    Abdominal pain    Bloating    Vomiting    Painful bowel movements    Itching, swelling, bleeding, or pain around the anus  Causes  Constipation can have many causes. These include:    Diet low in fiber    Too much " dairy    Not drinking enough liquids    Lack of exercise or physical activity (especially true for older adults)    Changes in lifestyle or daily routine, including pregnancy, aging, work, and travel    Frequent use or misuse of laxatives    Ignoring the urge to have a bowel movement or delaying it until later    Medicines, such as certain prescription pain medicines, iron supplements, antacids, certain antidepressants, and calcium supplements    Diseases like irritable bowel syndrome, bowel obstructions, stroke, diabetes, thyroid disease, Parkinson disease, hemorrhoids, and colon cancer  Complications  Potential complications of constipation can include:    Hemorrhoids    Rectal bleeding from hemorrhoids or anal fissures (skin tears)    Hernias    Dependency on laxatives    Chronic constipation    Fecal impaction, a severe form of constipation in which a large amount of hard stool is in your rectum that you can't pass    Bowel obstruction or perforation  Home care  All treatment should be done after talking with your healthcare provider. This is especially true if you have another medical problems, are taking prescription medicines, or are an older adult. Treatment most often involves lifestyle changes. You may also need medicines. Your healthcare provider will tell you which will work best for you. Follow the advice below to help avoid this problem in the future.  Lifestyle changes  These lifestyle changes can help prevent constipation:    Diet. Eat a high-fiber diet, with fresh fruit and vegetables, and reduce dairy intake, meats, and processed foods    Fluids. It's important to get enough fluids each day. Drink plenty of water when you eat more fiber. If you are on diet that limits the amount of fluid you can have, talk about this with your healthcare provider.    Regular exercise. Check with your healthcare provider first.  Medicines  Take any medicines as directed. Some laxatives are safe to use only every now  and then. Others can be taken on a regular basis. While laxatives don't cause bowel dependence, they are treating the symptoms. So your constipation may return if you don't make other changes. Talk with your healthcare provider or pharmacist if you have questions.  Prescription pain medicines can cause constipation. If you are taking this kind of medicine, ask your healthcare provider if you should also take a stool softener.  Medicines you may take to treat constipation include:    Fiber supplements    Stool softeners    Laxatives    Enemas    Rectal suppositories  Follow-up care  Follow up with your healthcare provider if symptoms don't get better in the next few days. You may need to have more tests or see a specialist.  Call 911  Call 911 if any of these occur:    Trouble breathing    Stiff, rigid abdomen that is severely painful to touch    Confusion    Fainting or loss of consciousness    Rapid heart rate    Chest pain  When to seek medical advice  Call your healthcare provider right away if any of these occur:    Fever of 100.4 F (38 C) or higher, or as directed by your healthcare provider    Failure to resume normal bowel movements    Pain in your abdomen or back gets worse    Nausea or vomiting    Swelling in your abdomen    Blood in the stool    Black, tarry stool    Involuntary weight loss    Weakness  [employer] last reviewed this educational content on 6/1/2018 2000-2020 [employer], [employer]. [address] [place] [address], [place] [address]. All rights reserved. This information is not intended as a substitute for professional medical care. Always follow your healthcare professional's instructions.               Return in about 2 weeks (around 12/14/2020), or if symptoms worsen or fail to improve.    JUVE Gaston [facility]

## 2020-11-30 NOTE — PATIENT INSTRUCTIONS
Xray does have significant amount of stool.  Recommend Miralax daily and Colace twice a day for 1-2 weeks and then as needed to have a good, soft bowel movement 1-2 times daily.  Will follow up when labs come in and if symptoms persist once constipation is resolved.    Patient Education     Constipation (Adult)  Constipation means that you have bowel movements that are less frequent than usual. Stools often become very hard and difficult to pass.  Constipation is very common. At some point in life, it affects almost everyone. Since everyone's bowel habits are different, what is constipation to one person may not be to another. Your healthcare provider may do tests to diagnose constipation. It depends on what he or she finds when evaluating you.    Symptoms of constipation include:    Abdominal pain    Bloating    Vomiting    Painful bowel movements    Itching, swelling, bleeding, or pain around the anus  Causes  Constipation can have many causes. These include:    Diet low in fiber    Too much dairy    Not drinking enough liquids    Lack of exercise or physical activity (especially true for older adults)    Changes in lifestyle or daily routine, including pregnancy, aging, work, and travel    Frequent use or misuse of laxatives    Ignoring the urge to have a bowel movement or delaying it until later    Medicines, such as certain prescription pain medicines, iron supplements, antacids, certain antidepressants, and calcium supplements    Diseases like irritable bowel syndrome, bowel obstructions, stroke, diabetes, thyroid disease, Parkinson disease, hemorrhoids, and colon cancer  Complications  Potential complications of constipation can include:    Hemorrhoids    Rectal bleeding from hemorrhoids or anal fissures (skin tears)    Hernias    Dependency on laxatives    Chronic constipation    Fecal impaction, a severe form of constipation in which a large amount of hard stool is in your rectum that you can't  pass    Bowel obstruction or perforation  Home care  All treatment should be done after talking with your healthcare provider. This is especially true if you have another medical problems, are taking prescription medicines, or are an older adult. Treatment most often involves lifestyle changes. You may also need medicines. Your healthcare provider will tell you which will work best for you. Follow the advice below to help avoid this problem in the future.  Lifestyle changes  These lifestyle changes can help prevent constipation:    Diet. Eat a high-fiber diet, with fresh fruit and vegetables, and reduce dairy intake, meats, and processed foods    Fluids. It's important to get enough fluids each day. Drink plenty of water when you eat more fiber. If you are on diet that limits the amount of fluid you can have, talk about this with your healthcare provider.    Regular exercise. Check with your healthcare provider first.  Medicines  Take any medicines as directed. Some laxatives are safe to use only every now and then. Others can be taken on a regular basis. While laxatives don't cause bowel dependence, they are treating the symptoms. So your constipation may return if you don't make other changes. Talk with your healthcare provider or pharmacist if you have questions.  Prescription pain medicines can cause constipation. If you are taking this kind of medicine, ask your healthcare provider if you should also take a stool softener.  Medicines you may take to treat constipation include:    Fiber supplements    Stool softeners    Laxatives    Enemas    Rectal suppositories  Follow-up care  Follow up with your healthcare provider if symptoms don't get better in the next few days. You may need to have more tests or see a specialist.  Call 911  Call 911 if any of these occur:    Trouble breathing    Stiff, rigid abdomen that is severely painful to touch    Confusion    Fainting or loss of consciousness    Rapid heart  rate    Chest pain  When to seek medical advice  Call your healthcare provider right away if any of these occur:    Fever of 100.4 F (38 C) or higher, or as directed by your healthcare provider    Failure to resume normal bowel movements    Pain in your abdomen or back gets worse    Nausea or vomiting    Swelling in your abdomen    Blood in the stool    Black, tarry stool    Involuntary weight loss    Weakness  Kassidy last reviewed this educational content on 6/1/2018 2000-2020 [email or web address]. [address] [place] [address], [place] [address]. All rights reserved. This information is not intended as a substitute for professional medical care. Always follow your healthcare professional's instructions.

## 2020-12-01 LAB
TTG IGA SER-ACNC: <1 U/ML
TTG IGG SER-ACNC: <1 U/ML

## 2020-12-14 ENCOUNTER — HEALTH MAINTENANCE LETTER (OUTPATIENT)
Age: 25
End: 2020-12-14

## 2021-01-01 ENCOUNTER — TRANSFERRED RECORDS (OUTPATIENT)
Dept: HEALTH INFORMATION MANAGEMENT | Facility: CLINIC | Age: 26
End: 2021-01-01

## 2021-01-01 LAB — PAP SMEAR - HIM PATIENT REPORTED: NEGATIVE

## 2021-04-18 ENCOUNTER — HEALTH MAINTENANCE LETTER (OUTPATIENT)
Age: 26
End: 2021-04-18

## 2021-08-09 ENCOUNTER — OFFICE VISIT (OUTPATIENT)
Dept: FAMILY MEDICINE | Facility: CLINIC | Age: 26
End: 2021-08-09
Payer: COMMERCIAL

## 2021-08-09 VITALS
TEMPERATURE: 98.4 F | BODY MASS INDEX: 27.45 KG/M2 | DIASTOLIC BLOOD PRESSURE: 72 MMHG | WEIGHT: 160.8 LBS | OXYGEN SATURATION: 98 % | SYSTOLIC BLOOD PRESSURE: 118 MMHG | HEIGHT: 64 IN | HEART RATE: 79 BPM

## 2021-08-09 DIAGNOSIS — R82.90 ABNORMAL FINDING IN URINE: Primary | ICD-10-CM

## 2021-08-09 LAB
ALBUMIN UR-MCNC: NEGATIVE MG/DL
AMORPH CRY #/AREA URNS HPF: ABNORMAL /HPF
APPEARANCE UR: CLEAR
BACTERIA #/AREA URNS HPF: ABNORMAL /HPF
BILIRUB UR QL STRIP: NEGATIVE
COLOR UR AUTO: YELLOW
GLUCOSE UR STRIP-MCNC: NEGATIVE MG/DL
HGB UR QL STRIP: NEGATIVE
KETONES UR STRIP-MCNC: NEGATIVE MG/DL
LEUKOCYTE ESTERASE UR QL STRIP: NEGATIVE
MUCOUS THREADS #/AREA URNS LPF: PRESENT /LPF
NITRATE UR QL: NEGATIVE
PH UR STRIP: 6.5 [PH] (ref 5–7)
RBC #/AREA URNS AUTO: ABNORMAL /HPF
SP GR UR STRIP: 1.02 (ref 1–1.03)
SQUAMOUS #/AREA URNS AUTO: ABNORMAL /LPF
UROBILINOGEN UR STRIP-ACNC: 0.2 E.U./DL
WBC #/AREA URNS AUTO: ABNORMAL /HPF

## 2021-08-09 PROCEDURE — 81001 URINALYSIS AUTO W/SCOPE: CPT | Performed by: PHYSICIAN ASSISTANT

## 2021-08-09 PROCEDURE — 99213 OFFICE O/P EST LOW 20 MIN: CPT | Performed by: PHYSICIAN ASSISTANT

## 2021-08-09 ASSESSMENT — MIFFLIN-ST. JEOR: SCORE: 1469.38

## 2021-08-09 ASSESSMENT — PAIN SCALES - GENERAL: PAINLEVEL: NO PAIN (0)

## 2021-08-09 NOTE — Clinical Note
Please abstract the following data from this visit with this patient into the appropriate field in Epic:    Tests that can be patient reported without a hard copy:    Pap smear done on this date: 01/2021 (approximately), by this group: [facility], results were normal

## 2021-08-09 NOTE — PROGRESS NOTES
"    Assessment & Plan     Abnormal finding in urine  UA is clear today.  Patient was able to show me picture of her urine which look consistent with an oily residue in her urine that is likely secondary to taking the Osmel weight loss supplement.  Advised that she avoid this medication to avoid this symptom.   She is agreeable.   - UA with Microscopic reflex to Culture - lab collect; Future  - Urine Microscopic       BMI:   Estimated body mass index is 27.6 kg/m  as calculated from the following:    Height as of this encounter: 1.626 m (5' 4\").    Weight as of this encounter: 72.9 kg (160 lb 12.8 oz).       Return in about 1 year (around 8/9/2022) for Physical Exam.    Garret Jonas PA-C  [facility]on is a 25 year old who presents for the following health issues     HPI     Concern - Usual things in urine       Mike presents to the clinic for evaluation of \" orange disk like residue\" noted in her urine and in the toilet after having a BM on 2 occasions last week.   She has not experienced any urinary frequency, urgency, hematuria or abdominal pain.  She has not noticed diarrhea or blood in the stool.  Of note, she started taking orlistat (Osmel) last week and noticed these symptoms shortly after taking her fist few doses.  She has since discontinued this medication and has not noticed the residue in her urine or stool since last week.  She is currently asymptomatic.       Please abstract the following data from this visit with this patient into the appropriate field in Epic:    Tests that can be patient reported without a hard copy:    Pap smear done on this date: 01/2021 (approximately), by this group: [facility], results were normal            Review of Systems   Constitutional, HEENT, cardiovascular, pulmonary, gi and gu systems are negative, except as otherwise noted.      Objective    /72   Pulse 79   Temp 98.4  F (36.9  C) (Tympanic)   " "Ht 1.626 m (5' 4\")   Wt 72.9 kg (160 lb 12.8 oz)   SpO2 98%   BMI 27.60 kg/m    Body mass index is 27.6 kg/m .  Physical Exam   GENERAL: healthy, alert and no distress  RESP: lungs clear to auscultation - no rales, rhonchi or wheezes  CV: regular rate and rhythm, normal S1 S2, no S3 or S4, no murmur, click or rub, no peripheral edema and peripheral pulses strong  ABDOMEN: soft, nontender, no hepatosplenomegaly, no masses and bowel sounds normal  PSYCH: mentation appears normal, affect normal/bright            "

## 2021-08-09 NOTE — RESULT ENCOUNTER NOTE
Mike-      Overall, your urine looks ok. Please discontinue the use of Osmel and follow up if the symptoms persist despite discontinuing this medication    Garret Jonas PA-C

## 2021-08-18 ENCOUNTER — APPOINTMENT (OUTPATIENT)
Dept: URBAN - METROPOLITAN AREA CLINIC 259 | Age: 26
Setting detail: DERMATOLOGY
End: 2021-08-18

## 2021-08-18 DIAGNOSIS — D22 MELANOCYTIC NEVI: ICD-10-CM

## 2021-08-18 DIAGNOSIS — L70.0 ACNE VULGARIS: ICD-10-CM

## 2021-08-18 PROBLEM — D22.5 MELANOCYTIC NEVI OF TRUNK: Status: ACTIVE | Noted: 2021-08-18

## 2021-08-18 PROCEDURE — OTHER COUNSELING: OTHER

## 2021-08-18 PROCEDURE — OTHER PRESCRIPTION: OTHER

## 2021-08-18 PROCEDURE — 99204 OFFICE O/P NEW MOD 45 MIN: CPT

## 2021-08-18 PROCEDURE — OTHER PRESCRIPTION MEDICATION MANAGEMENT: OTHER

## 2021-08-18 PROCEDURE — OTHER MIPS QUALITY: OTHER

## 2021-08-18 RX ORDER — TRETIONIN 0.25 MG/G
CREAM TOPICAL
Qty: 1 | Refills: 2 | Status: ERX | COMMUNITY
Start: 2021-08-18

## 2021-08-18 RX ORDER — CLINDAMYCIN PHOSPHATE 10 MG/ML
LOTION TOPICAL
Qty: 1 | Refills: 2 | Status: ERX | COMMUNITY
Start: 2021-08-18

## 2021-08-18 ASSESSMENT — LOCATION DETAILED DESCRIPTION DERM
LOCATION DETAILED: SUPERIOR MID FOREHEAD
LOCATION DETAILED: RIGHT SUPERIOR MEDIAL UPPER BACK

## 2021-08-18 ASSESSMENT — LOCATION ZONE DERM
LOCATION ZONE: TRUNK
LOCATION ZONE: FACE

## 2021-08-18 ASSESSMENT — LOCATION SIMPLE DESCRIPTION DERM
LOCATION SIMPLE: SUPERIOR FOREHEAD
LOCATION SIMPLE: RIGHT UPPER BACK

## 2021-08-18 NOTE — PROCEDURE: PRESCRIPTION MEDICATION MANAGEMENT
Initiate Treatment: Clindamycin lotion daily in the am, Tretinoin 0.025% cream nightly at bedtime
Detail Level: Zone
Render In Strict Bullet Format?: No

## 2021-08-18 NOTE — PROCEDURE: COUNSELING
Azithromycin Pregnancy And Lactation Text: This medication is considered safe during pregnancy and is also secreted in breast milk.
Tazorac Pregnancy And Lactation Text: This medication is not safe during pregnancy. It is unknown if this medication is excreted in breast milk.
Dapsone Counseling: I discussed with the patient the risks of dapsone including but not limited to hemolytic anemia, agranulocytosis, rashes, methemoglobinemia, kidney failure, peripheral neuropathy, headaches, GI upset, and liver toxicity.  Patients who start dapsone require monitoring including baseline LFTs and weekly CBCs for the first month, then every month thereafter.  The patient verbalized understanding of the proper use and possible adverse effects of dapsone.  All of the patient's questions and concerns were addressed.
Sarecycline Pregnancy And Lactation Text: This medication is Pregnancy Category D and not consider safe during pregnancy. It is also excreted in breast milk.
High Dose Vitamin A Counseling: Side effects reviewed, pt to contact office should one occur.
Isotretinoin Pregnancy And Lactation Text: This medication is Pregnancy Category X and is considered extremely dangerous during pregnancy. It is unknown if it is excreted in breast milk.
Topical Sulfur Applications Counseling: Topical Sulfur Counseling: Patient counseled that this medication may cause skin irritation or allergic reactions.  In the event of skin irritation, the patient was advised to reduce the amount of the drug applied or use it less frequently.   The patient verbalized understanding of the proper use and possible adverse effects of topical sulfur application.  All of the patient's questions and concerns were addressed.
Sarecycline Counseling: Patient advised regarding possible photosensitivity and discoloration of the teeth, skin, lips, tongue and gums.  Patient instructed to avoid sunlight, if possible.  When exposed to sunlight, patients should wear protective clothing, sunglasses, and sunscreen.  The patient was instructed to call the office immediately if the following severe adverse effects occur:  hearing changes, easy bruising/bleeding, severe headache, or vision changes.  The patient verbalized understanding of the proper use and possible adverse effects of sarecycline.  All of the patient's questions and concerns were addressed.
Bactrim Pregnancy And Lactation Text: This medication is Pregnancy Category D and is known to cause fetal risk.  It is also excreted in breast milk.
Detail Level: Simple
Isotretinoin Counseling: Patient should get monthly blood tests, not donate blood, not drive at night if vision affected, not share medication, and not undergo elective surgery for 6 months after tx completed. Side effects reviewed, pt to contact office should one occur.
Doxycycline Counseling:  Patient counseled regarding possible photosensitivity and increased risk for sunburn.  Patient instructed to avoid sunlight, if possible.  When exposed to sunlight, patients should wear protective clothing, sunglasses, and sunscreen.  The patient was instructed to call the office immediately if the following severe adverse effects occur:  hearing changes, easy bruising/bleeding, severe headache, or vision changes.  The patient verbalized understanding of the proper use and possible adverse effects of doxycycline.  All of the patient's questions and concerns were addressed.
Topical Retinoid Pregnancy And Lactation Text: This medication is Pregnancy Category C. It is unknown if this medication is excreted in breast milk.
Doxycycline Pregnancy And Lactation Text: This medication is Pregnancy Category D and not consider safe during pregnancy. It is also excreted in breast milk but is considered safe for shorter treatment courses.
Tazorac Counseling:  Patient advised that medication is irritating and drying.  Patient may need to apply sparingly and wash off after an hour before eventually leaving it on overnight.  The patient verbalized understanding of the proper use and possible adverse effects of tazorac.  All of the patient's questions and concerns were addressed.
Erythromycin Counseling:  I discussed with the patient the risks of erythromycin including but not limited to GI upset, allergic reaction, drug rash, diarrhea, increase in liver enzymes, and yeast infections.
Topical Clindamycin Pregnancy And Lactation Text: This medication is Pregnancy Category B and is considered safe during pregnancy. It is unknown if it is excreted in breast milk.
Tetracycline Counseling: Patient counseled regarding possible photosensitivity and increased risk for sunburn.  Patient instructed to avoid sunlight, if possible.  When exposed to sunlight, patients should wear protective clothing, sunglasses, and sunscreen.  The patient was instructed to call the office immediately if the following severe adverse effects occur:  hearing changes, easy bruising/bleeding, severe headache, or vision changes.  The patient verbalized understanding of the proper use and possible adverse effects of tetracycline.  All of the patient's questions and concerns were addressed. Patient understands to avoid pregnancy while on therapy due to potential birth defects.
High Dose Vitamin A Pregnancy And Lactation Text: High dose vitamin A therapy is contraindicated during pregnancy and breast feeding.
Topical Clindamycin Counseling: Patient counseled that this medication may cause skin irritation or allergic reactions.  In the event of skin irritation, the patient was advised to reduce the amount of the drug applied or use it less frequently.   The patient verbalized understanding of the proper use and possible adverse effects of clindamycin.  All of the patient's questions and concerns were addressed.
Erythromycin Pregnancy And Lactation Text: This medication is Pregnancy Category B and is considered safe during pregnancy. It is also excreted in breast milk.
Dapsone Pregnancy And Lactation Text: This medication is Pregnancy Category C and is not considered safe during pregnancy or breast feeding.
Birth Control Pills Pregnancy And Lactation Text: This medication should be avoided if pregnant and for the first 30 days post-partum.
Topical Sulfur Applications Pregnancy And Lactation Text: This medication is Pregnancy Category C and has an unknown safety profile during pregnancy. It is unknown if this topical medication is excreted in breast milk.
Benzoyl Peroxide Pregnancy And Lactation Text: This medication is Pregnancy Category C. It is unknown if benzoyl peroxide is excreted in breast milk.
Topical Retinoid counseling:  Patient advised to apply a pea-sized amount only at bedtime and wait 30 minutes after washing their face before applying.  If too drying, patient may add a non-comedogenic moisturizer. The patient verbalized understanding of the proper use and possible adverse effects of retinoids.  All of the patient's questions and concerns were addressed.
Bactrim Counseling:  I discussed with the patient the risks of sulfa antibiotics including but not limited to GI upset, allergic reaction, drug rash, diarrhea, dizziness, photosensitivity, and yeast infections.  Rarely, more serious reactions can occur including but not limited to aplastic anemia, agranulocytosis, methemoglobinemia, blood dyscrasias, liver or kidney failure, lung infiltrates or desquamative/blistering drug rashes.
Use Enhanced Medication Counseling?: No
Birth Control Pills Counseling: Birth Control Pill Counseling: I discussed with the patient the potential side effects of OCPs including but not limited to increased risk of stroke, heart attack, thrombophlebitis, deep venous thrombosis, hepatic adenomas, breast changes, GI upset, headaches, and depression.  The patient verbalized understanding of the proper use and possible adverse effects of OCPs. All of the patient's questions and concerns were addressed.
Detail Level: Zone
Spironolactone Pregnancy And Lactation Text: This medication can cause feminization of the male fetus and should be avoided during pregnancy. The active metabolite is also found in breast milk.
Benzoyl Peroxide Counseling: Patient counseled that medicine may cause skin irritation and bleach clothing.  In the event of skin irritation, the patient was advised to reduce the amount of the drug applied or use it less frequently.   The patient verbalized understanding of the proper use and possible adverse effects of benzoyl peroxide.  All of the patient's questions and concerns were addressed.
Minocycline Counseling: Patient advised regarding possible photosensitivity and discoloration of the teeth, skin, lips, tongue and gums.  Patient instructed to avoid sunlight, if possible.  When exposed to sunlight, patients should wear protective clothing, sunglasses, and sunscreen.  The patient was instructed to call the office immediately if the following severe adverse effects occur:  hearing changes, easy bruising/bleeding, severe headache, or vision changes.  The patient verbalized understanding of the proper use and possible adverse effects of minocycline.  All of the patient's questions and concerns were addressed.
Azithromycin Counseling:  I discussed with the patient the risks of azithromycin including but not limited to GI upset, allergic reaction, drug rash, diarrhea, and yeast infections.
Spironolactone Counseling: Patient advised regarding risks of diarrhea, abdominal pain, hyperkalemia, birth defects (for female patients), liver toxicity and renal toxicity. The patient may need blood work to monitor liver and kidney function and potassium levels while on therapy. The patient verbalized understanding of the proper use and possible adverse effects of spironolactone.  All of the patient's questions and concerns were addressed.

## 2021-09-29 ENCOUNTER — APPOINTMENT (OUTPATIENT)
Dept: URBAN - METROPOLITAN AREA CLINIC 259 | Age: 26
Setting detail: DERMATOLOGY
End: 2021-09-29

## 2021-09-29 DIAGNOSIS — L70.0 ACNE VULGARIS: ICD-10-CM

## 2021-09-29 PROCEDURE — OTHER PRESCRIPTION MEDICATION MANAGEMENT: OTHER

## 2021-09-29 PROCEDURE — 99214 OFFICE O/P EST MOD 30 MIN: CPT

## 2021-09-29 PROCEDURE — OTHER PRESCRIPTION: OTHER

## 2021-09-29 PROCEDURE — OTHER MIPS QUALITY: OTHER

## 2021-09-29 PROCEDURE — OTHER COUNSELING: OTHER

## 2021-09-29 RX ORDER — TRETIONIN 1 MG/G
CREAM TOPICAL
Qty: 45 | Refills: 1 | Status: ERX | COMMUNITY
Start: 2021-09-29

## 2021-09-29 ASSESSMENT — LOCATION DETAILED DESCRIPTION DERM: LOCATION DETAILED: LEFT INFERIOR CENTRAL MALAR CHEEK

## 2021-09-29 ASSESSMENT — LOCATION SIMPLE DESCRIPTION DERM: LOCATION SIMPLE: LEFT CHEEK

## 2021-09-29 ASSESSMENT — LOCATION ZONE DERM: LOCATION ZONE: FACE

## 2021-09-29 NOTE — PROCEDURE: PRESCRIPTION MEDICATION MANAGEMENT
Detail Level: Zone
Modify Regimen: Increase tretinoin to .1%, continue use QPM
Continue Regimen: Clindamycin QAM
Plan: Pt did not need refills on clindamycin at this time, but can call if she needs more before her follow up. We briefly discussed adding an oral medication, but pt would like to give topicals more time to see if it can improve with those.
Render In Strict Bullet Format?: No

## 2021-09-29 NOTE — PROCEDURE: MIPS QUALITY
Quality 431: Preventive Care And Screening: Unhealthy Alcohol Use - Screening: Patient not identified as an unhealthy alcohol user when screened for unhealthy alcohol use using a systematic screening method
Quality 226: Preventive Care And Screening: Tobacco Use: Screening And Cessation Intervention: Patient screened for tobacco use and is an ex/non-smoker
Quality 130: Documentation Of Current Medications In The Medical Record: Current Medications Documented
Quality 110: Preventive Care And Screening: Influenza Immunization: Influenza Immunization Ordered or Recommended, but not Administered due to system reason
Detail Level: Detailed

## 2021-09-29 NOTE — PROCEDURE: COUNSELING
Topical Sulfur Applications Counseling: Topical Sulfur Counseling: Patient counseled that this medication may cause skin irritation or allergic reactions.  In the event of skin irritation, the patient was advised to reduce the amount of the drug applied or use it less frequently.   The patient verbalized understanding of the proper use and possible adverse effects of topical sulfur application.  All of the patient's questions and concerns were addressed.
Bactrim Pregnancy And Lactation Text: This medication is Pregnancy Category D and is known to cause fetal risk.  It is also excreted in breast milk.
Minocycline Counseling: Patient advised regarding possible photosensitivity and discoloration of the teeth, skin, lips, tongue and gums.  Patient instructed to avoid sunlight, if possible.  When exposed to sunlight, patients should wear protective clothing, sunglasses, and sunscreen.  The patient was instructed to call the office immediately if the following severe adverse effects occur:  hearing changes, easy bruising/bleeding, severe headache, or vision changes.  The patient verbalized understanding of the proper use and possible adverse effects of minocycline.  All of the patient's questions and concerns were addressed.
Isotretinoin Pregnancy And Lactation Text: This medication is Pregnancy Category X and is considered extremely dangerous during pregnancy. It is unknown if it is excreted in breast milk.
Dapsone Counseling: I discussed with the patient the risks of dapsone including but not limited to hemolytic anemia, agranulocytosis, rashes, methemoglobinemia, kidney failure, peripheral neuropathy, headaches, GI upset, and liver toxicity.  Patients who start dapsone require monitoring including baseline LFTs and weekly CBCs for the first month, then every month thereafter.  The patient verbalized understanding of the proper use and possible adverse effects of dapsone.  All of the patient's questions and concerns were addressed.
Topical Retinoid Pregnancy And Lactation Text: This medication is Pregnancy Category C. It is unknown if this medication is excreted in breast milk.
Topical Clindamycin Counseling: Patient counseled that this medication may cause skin irritation or allergic reactions.  In the event of skin irritation, the patient was advised to reduce the amount of the drug applied or use it less frequently.   The patient verbalized understanding of the proper use and possible adverse effects of clindamycin.  All of the patient's questions and concerns were addressed.
Tazorac Counseling:  Patient advised that medication is irritating and drying.  Patient may need to apply sparingly and wash off after an hour before eventually leaving it on overnight.  The patient verbalized understanding of the proper use and possible adverse effects of tazorac.  All of the patient's questions and concerns were addressed.
Spironolactone Pregnancy And Lactation Text: This medication can cause feminization of the male fetus and should be avoided during pregnancy. The active metabolite is also found in breast milk.
Birth Control Pills Counseling: Birth Control Pill Counseling: I discussed with the patient the potential side effects of OCPs including but not limited to increased risk of stroke, heart attack, thrombophlebitis, deep venous thrombosis, hepatic adenomas, breast changes, GI upset, headaches, and depression.  The patient verbalized understanding of the proper use and possible adverse effects of OCPs. All of the patient's questions and concerns were addressed.
Doxycycline Pregnancy And Lactation Text: This medication is Pregnancy Category D and not consider safe during pregnancy. It is also excreted in breast milk but is considered safe for shorter treatment courses.
High Dose Vitamin A Pregnancy And Lactation Text: High dose vitamin A therapy is contraindicated during pregnancy and breast feeding.
Topical Retinoid counseling:  Patient advised to apply a pea-sized amount only at bedtime and wait 30 minutes after washing their face before applying.  If too drying, patient may add a non-comedogenic moisturizer. The patient verbalized understanding of the proper use and possible adverse effects of retinoids.  All of the patient's questions and concerns were addressed.
Erythromycin Pregnancy And Lactation Text: This medication is Pregnancy Category B and is considered safe during pregnancy. It is also excreted in breast milk.
Topical Clindamycin Pregnancy And Lactation Text: This medication is Pregnancy Category B and is considered safe during pregnancy. It is unknown if it is excreted in breast milk.
Sarecycline Pregnancy And Lactation Text: This medication is Pregnancy Category D and not consider safe during pregnancy. It is also excreted in breast milk.
Isotretinoin Counseling: Patient should get monthly blood tests, not donate blood, not drive at night if vision affected, not share medication, and not undergo elective surgery for 6 months after tx completed. Side effects reviewed, pt to contact office should one occur.
Azithromycin Pregnancy And Lactation Text: This medication is considered safe during pregnancy and is also secreted in breast milk.
Spironolactone Counseling: Patient advised regarding risks of diarrhea, abdominal pain, hyperkalemia, birth defects (for female patients), liver toxicity and renal toxicity. The patient may need blood work to monitor liver and kidney function and potassium levels while on therapy. The patient verbalized understanding of the proper use and possible adverse effects of spironolactone.  All of the patient's questions and concerns were addressed.
Benzoyl Peroxide Pregnancy And Lactation Text: This medication is Pregnancy Category C. It is unknown if benzoyl peroxide is excreted in breast milk.
Doxycycline Counseling:  Patient counseled regarding possible photosensitivity and increased risk for sunburn.  Patient instructed to avoid sunlight, if possible.  When exposed to sunlight, patients should wear protective clothing, sunglasses, and sunscreen.  The patient was instructed to call the office immediately if the following severe adverse effects occur:  hearing changes, easy bruising/bleeding, severe headache, or vision changes.  The patient verbalized understanding of the proper use and possible adverse effects of doxycycline.  All of the patient's questions and concerns were addressed.
Detail Level: Zone
Sarecycline Counseling: Patient advised regarding possible photosensitivity and discoloration of the teeth, skin, lips, tongue and gums.  Patient instructed to avoid sunlight, if possible.  When exposed to sunlight, patients should wear protective clothing, sunglasses, and sunscreen.  The patient was instructed to call the office immediately if the following severe adverse effects occur:  hearing changes, easy bruising/bleeding, severe headache, or vision changes.  The patient verbalized understanding of the proper use and possible adverse effects of sarecycline.  All of the patient's questions and concerns were addressed.
Tazorac Pregnancy And Lactation Text: This medication is not safe during pregnancy. It is unknown if this medication is excreted in breast milk.
Benzoyl Peroxide Counseling: Patient counseled that medicine may cause skin irritation and bleach clothing.  In the event of skin irritation, the patient was advised to reduce the amount of the drug applied or use it less frequently.   The patient verbalized understanding of the proper use and possible adverse effects of benzoyl peroxide.  All of the patient's questions and concerns were addressed.
Dapsone Pregnancy And Lactation Text: This medication is Pregnancy Category C and is not considered safe during pregnancy or breast feeding.
Tetracycline Counseling: Patient counseled regarding possible photosensitivity and increased risk for sunburn.  Patient instructed to avoid sunlight, if possible.  When exposed to sunlight, patients should wear protective clothing, sunglasses, and sunscreen.  The patient was instructed to call the office immediately if the following severe adverse effects occur:  hearing changes, easy bruising/bleeding, severe headache, or vision changes.  The patient verbalized understanding of the proper use and possible adverse effects of tetracycline.  All of the patient's questions and concerns were addressed. Patient understands to avoid pregnancy while on therapy due to potential birth defects.
Include Pregnancy/Lactation Warning?: No
Erythromycin Counseling:  I discussed with the patient the risks of erythromycin including but not limited to GI upset, allergic reaction, drug rash, diarrhea, increase in liver enzymes, and yeast infections.
Topical Sulfur Applications Pregnancy And Lactation Text: This medication is Pregnancy Category C and has an unknown safety profile during pregnancy. It is unknown if this topical medication is excreted in breast milk.
Bactrim Counseling:  I discussed with the patient the risks of sulfa antibiotics including but not limited to GI upset, allergic reaction, drug rash, diarrhea, dizziness, photosensitivity, and yeast infections.  Rarely, more serious reactions can occur including but not limited to aplastic anemia, agranulocytosis, methemoglobinemia, blood dyscrasias, liver or kidney failure, lung infiltrates or desquamative/blistering drug rashes.
Birth Control Pills Pregnancy And Lactation Text: This medication should be avoided if pregnant and for the first 30 days post-partum.
Azithromycin Counseling:  I discussed with the patient the risks of azithromycin including but not limited to GI upset, allergic reaction, drug rash, diarrhea, and yeast infections.
High Dose Vitamin A Counseling: Side effects reviewed, pt to contact office should one occur.

## 2021-10-02 ENCOUNTER — HEALTH MAINTENANCE LETTER (OUTPATIENT)
Age: 26
End: 2021-10-02

## 2022-05-14 ENCOUNTER — HEALTH MAINTENANCE LETTER (OUTPATIENT)
Age: 27
End: 2022-05-14

## 2022-09-03 ENCOUNTER — HEALTH MAINTENANCE LETTER (OUTPATIENT)
Age: 27
End: 2022-09-03

## 2023-06-03 ENCOUNTER — HEALTH MAINTENANCE LETTER (OUTPATIENT)
Age: 28
End: 2023-06-03

## 2024-07-06 ENCOUNTER — HEALTH MAINTENANCE LETTER (OUTPATIENT)
Age: 29
End: 2024-07-06

## 2024-08-20 ENCOUNTER — APPOINTMENT (OUTPATIENT)
Dept: URBAN - METROPOLITAN AREA CLINIC 259 | Age: 29
Setting detail: DERMATOLOGY
End: 2024-08-21

## 2024-08-20 DIAGNOSIS — L30.9 DERMATITIS, UNSPECIFIED: ICD-10-CM

## 2024-08-20 DIAGNOSIS — R59.0 LOCALIZED ENLARGED LYMPH NODES: ICD-10-CM

## 2024-08-20 PROCEDURE — 99214 OFFICE O/P EST MOD 30 MIN: CPT

## 2024-08-20 PROCEDURE — OTHER PRESCRIPTION MEDICATION MANAGEMENT: OTHER

## 2024-08-20 PROCEDURE — OTHER COUNSELING: OTHER

## 2024-08-20 PROCEDURE — OTHER MIPS QUALITY: OTHER

## 2024-08-20 ASSESSMENT — LOCATION ZONE DERM
LOCATION ZONE: SCALP
LOCATION ZONE: NECK

## 2024-08-20 ASSESSMENT — BSA RASH: BSA RASH: 3

## 2024-08-20 ASSESSMENT — LOCATION DETAILED DESCRIPTION DERM
LOCATION DETAILED: RIGHT POSTAURICULAR SKIN
LOCATION DETAILED: LEFT OCCIPITAL SCALP
LOCATION DETAILED: MID-OCCIPITAL SCALP
LOCATION DETAILED: RIGHT CENTRAL LATERAL NECK
LOCATION DETAILED: LEFT INFERIOR OCCIPITAL SCALP

## 2024-08-20 ASSESSMENT — ITCH NUMERIC RATING SCALE: HOW SEVERE IS YOUR ITCHING?: 4

## 2024-08-20 ASSESSMENT — LOCATION SIMPLE DESCRIPTION DERM
LOCATION SIMPLE: NECK
LOCATION SIMPLE: POSTERIOR SCALP

## 2024-08-20 NOTE — PROCEDURE: PRESCRIPTION MEDICATION MANAGEMENT
Render In Strict Bullet Format?: No
Continue Regimen: Clobetasol 0.05% Scalp Soultion BID\\nKetoconazole 2% Shampoo 2-3x weekly\\nCephalexin 500 mg as prescribed by PCP
Detail Level: Zone
Plan: Discussed differential diagnosis and treatment options with patient and her mother, noting that both psoriasis and seborrheic dermatitis are treated similarly with the topical medication that the patient is currently taking. Advised patient to continue medication for 4-6 weeks before returning to the clinic for a follow up appointment. Patient was given a sample of Zoryve. It was recommended that she apply the Zoryve to half of her scalp and the Clobetasol to the other half and monitor which gives her more relief. Recommended patient  some OTC shampoos, such as Head and Shoulders and/or Neutrogena tsal/gel to rotate in with ketoconazole.
Samples Given: Zoryve- may trial as alternative to clobetasol and call if working well

## 2024-08-20 NOTE — PROCEDURE: COUNSELING
Detail Level: Detailed
Patient Specific Counseling (Will Not Stick From Patient To Patient): **continue cephalexin 500 mg as prescribed by PCP. Discussed rash does not appear to be infectious at this time, but inflammation may be contributing. Consider culture of rash if experiencing weeping again (none visible on exam today)\\n**advised to follow up with PCP if swollen lymph node not resolving following cephalexin completion

## 2024-08-20 NOTE — HPI: RASH
What Type Of Note Output Would You Prefer (Optional)?: Standard Output
Is This A New Presentation, Or A Follow-Up?: Rash
Additional History: The patient reports a rash throughout the posterior aspect of her neck/hairline. She describes the rash to be very painful, red, flaky and \"wet.\" Patient states that she has been treating this rash with her PCP for the past few months, noting that she has received a prescription for ketoconazole shampoo and clobetasol solution. She notes minimal relief thus far, and she reports some burning sensation when she first used the clobetasol solution. States she recently starting this medication. Patient notes that she is currently taking a course of oral antibiotics due to a possible bacterial infection causing some inflammation in her lymph nodes. Patient rates her itch score a 4/10 today.

## 2024-10-01 ENCOUNTER — APPOINTMENT (OUTPATIENT)
Dept: URBAN - METROPOLITAN AREA CLINIC 259 | Age: 29
Setting detail: DERMATOLOGY
End: 2024-10-02

## 2024-10-01 DIAGNOSIS — R59.0 LOCALIZED ENLARGED LYMPH NODES: ICD-10-CM

## 2024-10-01 DIAGNOSIS — L01.01 NON-BULLOUS IMPETIGO: ICD-10-CM

## 2024-10-01 DIAGNOSIS — L40.0 PSORIASIS VULGARIS: ICD-10-CM

## 2024-10-01 PROCEDURE — OTHER INTRALESIONAL KENALOG: OTHER

## 2024-10-01 PROCEDURE — 99214 OFFICE O/P EST MOD 30 MIN: CPT | Mod: 25

## 2024-10-01 PROCEDURE — OTHER COUNSELING: OTHER

## 2024-10-01 PROCEDURE — 11900 INJECT SKIN LESIONS </W 7: CPT

## 2024-10-01 PROCEDURE — OTHER MIPS QUALITY: OTHER

## 2024-10-01 PROCEDURE — OTHER PRESCRIPTION: OTHER

## 2024-10-01 PROCEDURE — OTHER SEPARATE AND IDENTIFIABLE DOCUMENTATION: OTHER

## 2024-10-01 PROCEDURE — OTHER PRESCRIPTION MEDICATION MANAGEMENT: OTHER

## 2024-10-01 RX ORDER — BETAMETHASONE VALERATE 1.2 MG/G
AEROSOL, FOAM TOPICAL
Qty: 100 | Refills: 2 | Status: ERX | COMMUNITY
Start: 2024-10-01

## 2024-10-01 RX ORDER — MUPIROCIN 20 MG/G
OINTMENT TOPICAL BID
Qty: 22 | Refills: 0 | Status: ERX | COMMUNITY
Start: 2024-10-01

## 2024-10-01 ASSESSMENT — LOCATION DETAILED DESCRIPTION DERM
LOCATION DETAILED: RIGHT UPPER CUTANEOUS LIP
LOCATION DETAILED: RIGHT CENTRAL LATERAL NECK
LOCATION DETAILED: MID-OCCIPITAL SCALP
LOCATION DETAILED: LEFT INFERIOR OCCIPITAL SCALP
LOCATION DETAILED: RIGHT INFERIOR OCCIPITAL SCALP

## 2024-10-01 ASSESSMENT — LOCATION ZONE DERM
LOCATION ZONE: SCALP
LOCATION ZONE: LIP
LOCATION ZONE: NECK

## 2024-10-01 ASSESSMENT — PGA PSORIASIS: PGA PSORIASIS 2020: MODERATE

## 2024-10-01 ASSESSMENT — LOCATION SIMPLE DESCRIPTION DERM
LOCATION SIMPLE: RIGHT LIP
LOCATION SIMPLE: POSTERIOR SCALP
LOCATION SIMPLE: NECK

## 2024-10-01 ASSESSMENT — ITCH NUMERIC RATING SCALE: HOW SEVERE IS YOUR ITCHING?: 7

## 2024-10-01 ASSESSMENT — BSA PSORIASIS: % BODY COVERED IN PSORIASIS: 5

## 2024-10-01 NOTE — PROCEDURE: PRESCRIPTION MEDICATION MANAGEMENT
Render In Strict Bullet Format?: No
Detail Level: Zone
Plan: Recommended Neutrogena t-sal/gel shampoo and/or Nizoral psoriasis shampoo as alternative options. RTC in one month.
Initiate Treatment: betamethasone valerate 0.12 % topical foam BID 
Discontinue Regimen: Cephalexin 500 mg\\nClobetasol 0.05% Scalp Soultion BID\\nKetoconazole 2% Shampoo 2-3x weekly
Initiate Treatment: mupirocin 2 % topical ointment Bid

## 2024-10-01 NOTE — PROCEDURE: COUNSELING
Detail Level: Detailed
Patient Specific Counseling (Will Not Stick From Patient To Patient): **\\nThe patient reports that her lymph node has only decreased in size a small amount since her last visit. Patient saw ENT who prescribed her a different antibiotic, and she states that she has imaging tomorrow. Recommended that patient continue following up with ENT as scheduled. Reviewed inflammation from psoriasis could be contributing but difficult to determine this.

## 2024-10-01 NOTE — PROCEDURE: INTRALESIONAL KENALOG
Kenalog Type Of Vial: Multiple Dose
How Many Mls Were Removed From The 40 Mg/Ml (1ml) Vial When Preparing The Injectable Solution?: 0
Consent: The risks of atrophy were reviewed with the patient.
Lot # For Kenalog (Optional): [id number]
Administered By (Optional): Oumou Freedman PA-C
Detail Level: Detailed
Require Ndc Code?: No
Expiration Date For Kenalog (Optional): 04/2026
Ndc# For Kenalog Only: 1808-8431-76
Concentration Of Kenalog Solution Injected (Mg/Ml): 2.0
Show Inventory Tab: Hide
Medical Necessity Clause: This procedure was medically necessary because the lesions that were treated were:
Total Volume (Ccs): 0.8
Validate Note Data When Using Inventory: Yes
Kenalog Preparation: Kenalog

## 2024-11-05 ENCOUNTER — APPOINTMENT (OUTPATIENT)
Dept: URBAN - METROPOLITAN AREA CLINIC 259 | Age: 29
Setting detail: DERMATOLOGY
End: 2024-11-06

## 2024-11-05 VITALS — WEIGHT: 195 LBS | HEIGHT: 64 IN

## 2024-11-05 DIAGNOSIS — L40.0 PSORIASIS VULGARIS: ICD-10-CM

## 2024-11-05 PROCEDURE — 99214 OFFICE O/P EST MOD 30 MIN: CPT

## 2024-11-05 PROCEDURE — OTHER PRESCRIPTION MEDICATION MANAGEMENT: OTHER

## 2024-11-05 PROCEDURE — OTHER COUNSELING: OTHER

## 2024-11-05 PROCEDURE — OTHER MIPS QUALITY: OTHER

## 2024-11-05 ASSESSMENT — LOCATION ZONE DERM: LOCATION ZONE: SCALP

## 2024-11-05 ASSESSMENT — LOCATION DETAILED DESCRIPTION DERM
LOCATION DETAILED: MID-OCCIPITAL SCALP
LOCATION DETAILED: RIGHT OCCIPITAL SCALP
LOCATION DETAILED: LEFT OCCIPITAL SCALP

## 2024-11-05 ASSESSMENT — BSA PSORIASIS: % BODY COVERED IN PSORIASIS: 5

## 2024-11-05 ASSESSMENT — ITCH NUMERIC RATING SCALE: HOW SEVERE IS YOUR ITCHING?: 0

## 2024-11-05 ASSESSMENT — LOCATION SIMPLE DESCRIPTION DERM: LOCATION SIMPLE: POSTERIOR SCALP

## 2024-11-05 NOTE — PROCEDURE: MIPS QUALITY
Quality 485: Psoriasis - Improvement In Patient-Reported Itch Severity: Itch severity assessment score is reduced by 3 or more points from the initial (index) assessment score to the follow-up visit score
Quality 226: Preventive Care And Screening: Tobacco Use: Screening And Cessation Intervention: Tobacco Screening not Performed
Quality 130: Documentation Of Current Medications In The Medical Record: Current Medications Documented
Detail Level: Detailed

## 2024-11-05 NOTE — PROCEDURE: PRESCRIPTION MEDICATION MANAGEMENT
Defer Treatment (Provide Reason For Deferment In Text Field Below): ILK - may consider in the future if flaring again
Render In Strict Bullet Format?: No
Detail Level: Zone
Plan: Recommended Neutrogena t-sal/gel shampoo and/or Nizoral psoriasis shampoo \\nRecheck in 3 months, sooner if flaring
Initiate Treatment: betamethasone valerate 0.12 % topical foam BID

## 2025-04-01 ENCOUNTER — RX ONLY (RX ONLY)
Age: 30
End: 2025-04-01

## 2025-04-01 ENCOUNTER — APPOINTMENT (OUTPATIENT)
Dept: URBAN - METROPOLITAN AREA CLINIC 259 | Age: 30
Setting detail: DERMATOLOGY
End: 2025-04-02

## 2025-04-01 VITALS — HEIGHT: 64 IN | WEIGHT: 190 LBS

## 2025-04-01 DIAGNOSIS — L40.0 PSORIASIS VULGARIS: ICD-10-CM

## 2025-04-01 PROCEDURE — OTHER PRESCRIPTION MEDICATION MANAGEMENT: OTHER

## 2025-04-01 PROCEDURE — 99213 OFFICE O/P EST LOW 20 MIN: CPT

## 2025-04-01 PROCEDURE — OTHER COUNSELING: OTHER

## 2025-04-01 PROCEDURE — OTHER MIPS QUALITY: OTHER

## 2025-04-01 PROCEDURE — OTHER PRESCRIPTION: OTHER

## 2025-04-01 RX ORDER — KETOCONAZOLE 20 MG/ML
SHAMPOO, SUSPENSION TOPICAL
Qty: 120 | Refills: 3 | Status: ERX | COMMUNITY
Start: 2025-04-01

## 2025-04-01 RX ORDER — BETAMETHASONE VALERATE 1.2 MG/G
AEROSOL, FOAM TOPICAL
Qty: 100 | Refills: 3 | Status: ERX | COMMUNITY
Start: 2025-04-01

## 2025-04-01 ASSESSMENT — LOCATION DETAILED DESCRIPTION DERM
LOCATION DETAILED: LEFT OCCIPITAL SCALP
LOCATION DETAILED: MID-OCCIPITAL SCALP
LOCATION DETAILED: RIGHT OCCIPITAL SCALP

## 2025-04-01 ASSESSMENT — LOCATION SIMPLE DESCRIPTION DERM: LOCATION SIMPLE: POSTERIOR SCALP

## 2025-04-01 ASSESSMENT — LOCATION ZONE DERM: LOCATION ZONE: SCALP

## 2025-04-01 NOTE — PROCEDURE: PRESCRIPTION MEDICATION MANAGEMENT
Render In Strict Bullet Format?: No
Continue Regimen: betamethasone valerate 0.12 % topical foam BID to affected areas
Detail Level: Zone
Plan: - Pt ok to use betamethasone foam on the arms, chest, or other affected areas if noticing new flares\\n- Encouraged pt to trial keto shampoo again as it may be beneficial for preventing flares\\nRTC as needed
Initiate Treatment: ketoconazole 2 % shampoo TIW